# Patient Record
Sex: FEMALE | Race: WHITE | Employment: OTHER
[De-identification: names, ages, dates, MRNs, and addresses within clinical notes are randomized per-mention and may not be internally consistent; named-entity substitution may affect disease eponyms.]

---

## 2017-01-03 RX ORDER — NAPROXEN 500 MG/1
500 TABLET ORAL 2 TIMES DAILY WITH MEALS
Qty: 60 TABLET | Refills: 0 | Status: SHIPPED | OUTPATIENT
Start: 2017-01-03 | End: 2017-01-09 | Stop reason: SDUPTHER

## 2017-01-09 ENCOUNTER — TELEPHONE (OUTPATIENT)
Dept: PRIMARY CARE CLINIC | Facility: CLINIC | Age: 47
End: 2017-01-09

## 2017-01-09 ENCOUNTER — OFFICE VISIT (OUTPATIENT)
Dept: PRIMARY CARE CLINIC | Facility: CLINIC | Age: 47
End: 2017-01-09

## 2017-01-09 VITALS
WEIGHT: 204.5 LBS | BODY MASS INDEX: 36.23 KG/M2 | RESPIRATION RATE: 18 BRPM | HEART RATE: 82 BPM | HEIGHT: 63 IN | DIASTOLIC BLOOD PRESSURE: 106 MMHG | SYSTOLIC BLOOD PRESSURE: 136 MMHG

## 2017-01-09 DIAGNOSIS — M25.552 PAIN OF BOTH HIP JOINTS: ICD-10-CM

## 2017-01-09 DIAGNOSIS — G40.909 SEIZURE DISORDER (HCC): Primary | ICD-10-CM

## 2017-01-09 DIAGNOSIS — M25.551 PAIN OF BOTH HIP JOINTS: ICD-10-CM

## 2017-01-09 DIAGNOSIS — M54.50 CHRONIC BILATERAL LOW BACK PAIN WITHOUT SCIATICA: ICD-10-CM

## 2017-01-09 DIAGNOSIS — K59.01 SLOW TRANSIT CONSTIPATION: ICD-10-CM

## 2017-01-09 DIAGNOSIS — G89.29 CHRONIC BILATERAL LOW BACK PAIN WITHOUT SCIATICA: ICD-10-CM

## 2017-01-09 DIAGNOSIS — G43.709 CHRONIC MIGRAINE WITHOUT AURA WITHOUT STATUS MIGRAINOSUS, NOT INTRACTABLE: ICD-10-CM

## 2017-01-09 PROCEDURE — 99214 OFFICE O/P EST MOD 30 MIN: CPT | Performed by: FAMILY MEDICINE

## 2017-01-09 RX ORDER — BUTALBITAL/ACETAMINOPHEN 50MG-325MG
1 TABLET ORAL 4 TIMES DAILY PRN
Qty: 120 TABLET | Refills: 2 | Status: SHIPPED | OUTPATIENT
Start: 2017-01-09 | End: 2020-04-07

## 2017-01-09 RX ORDER — ACETAMINOPHEN AND CODEINE PHOSPHATE 300; 30 MG/1; MG/1
1 TABLET ORAL EVERY 6 HOURS PRN
Qty: 60 TABLET | Refills: 0 | Status: SHIPPED | OUTPATIENT
Start: 2017-01-09 | End: 2017-06-12 | Stop reason: ALTCHOICE

## 2017-01-09 RX ORDER — NAPROXEN 500 MG/1
500 TABLET ORAL 2 TIMES DAILY WITH MEALS
Qty: 60 TABLET | Refills: 0 | Status: SHIPPED | OUTPATIENT
Start: 2017-01-09 | End: 2017-01-31 | Stop reason: SDUPTHER

## 2017-01-09 RX ORDER — PROPRANOLOL HYDROCHLORIDE 80 MG/1
80 TABLET ORAL DAILY
Qty: 90 TABLET | Refills: 1 | Status: SHIPPED | OUTPATIENT
Start: 2017-01-09 | End: 2017-06-12 | Stop reason: SDUPTHER

## 2017-01-09 RX ORDER — ACETAMINOPHEN 500 MG
500 TABLET ORAL EVERY 6 HOURS PRN
COMMUNITY

## 2017-01-09 RX ORDER — CARBAMAZEPINE 200 MG
400 TABLET ORAL 2 TIMES DAILY
Qty: 120 TABLET | Refills: 5 | Status: SHIPPED | OUTPATIENT
Start: 2017-01-09 | End: 2017-06-12 | Stop reason: ALTCHOICE

## 2017-01-09 RX ORDER — SUMATRIPTAN 100 MG/1
100 TABLET, FILM COATED ORAL
Qty: 8 TABLET | Refills: 0 | Status: SHIPPED | OUTPATIENT
Start: 2017-01-09 | End: 2017-02-28 | Stop reason: SDUPTHER

## 2017-01-09 RX ORDER — DULOXETIN HYDROCHLORIDE 60 MG/1
60 CAPSULE, DELAYED RELEASE ORAL DAILY
Qty: 30 CAPSULE | Refills: 5 | Status: SHIPPED | OUTPATIENT
Start: 2017-01-09 | End: 2017-06-12 | Stop reason: SDUPTHER

## 2017-01-09 RX ORDER — OMEPRAZOLE 20 MG/1
40 CAPSULE, DELAYED RELEASE ORAL DAILY
Qty: 180 CAPSULE | Refills: 1 | Status: SHIPPED | OUTPATIENT
Start: 2017-01-09 | End: 2017-08-02 | Stop reason: SDUPTHER

## 2017-01-16 ENCOUNTER — TELEPHONE (OUTPATIENT)
Dept: PRIMARY CARE CLINIC | Facility: CLINIC | Age: 47
End: 2017-01-16

## 2017-01-22 ASSESSMENT — ENCOUNTER SYMPTOMS
PHOTOPHOBIA: 0
NAUSEA: 0
DIARRHEA: 0
TROUBLE SWALLOWING: 0
SHORTNESS OF BREATH: 0
ABDOMINAL DISTENTION: 0
COUGH: 0
WHEEZING: 0
VOMITING: 0
COLOR CHANGE: 0
ABDOMINAL PAIN: 0
BACK PAIN: 1
CONSTIPATION: 0

## 2017-01-31 RX ORDER — NAPROXEN 500 MG/1
500 TABLET ORAL 2 TIMES DAILY WITH MEALS
Qty: 60 TABLET | Refills: 0 | Status: SHIPPED | OUTPATIENT
Start: 2017-01-31 | End: 2017-03-02 | Stop reason: SDUPTHER

## 2017-01-31 RX ORDER — ACETAMINOPHEN AND CODEINE PHOSPHATE 300; 30 MG/1; MG/1
1 TABLET ORAL EVERY 6 HOURS PRN
Qty: 60 TABLET | Refills: 0 | Status: CANCELLED | OUTPATIENT
Start: 2017-01-31

## 2017-02-28 RX ORDER — SUMATRIPTAN 100 MG/1
100 TABLET, FILM COATED ORAL
Qty: 8 TABLET | Refills: 0 | Status: SHIPPED | OUTPATIENT
Start: 2017-02-28 | End: 2017-04-24 | Stop reason: SDUPTHER

## 2017-03-02 RX ORDER — MIRTAZAPINE 45 MG/1
45 TABLET, FILM COATED ORAL NIGHTLY
Qty: 30 TABLET | Refills: 3 | Status: SHIPPED | OUTPATIENT
Start: 2017-03-02 | End: 2017-05-09 | Stop reason: SDUPTHER

## 2017-03-02 RX ORDER — NAPROXEN 500 MG/1
500 TABLET ORAL 2 TIMES DAILY WITH MEALS
Qty: 60 TABLET | Refills: 0 | Status: SHIPPED | OUTPATIENT
Start: 2017-03-02 | End: 2017-04-10 | Stop reason: SDUPTHER

## 2017-04-10 RX ORDER — NAPROXEN 500 MG/1
500 TABLET ORAL 2 TIMES DAILY WITH MEALS
Qty: 60 TABLET | Refills: 0 | Status: SHIPPED | OUTPATIENT
Start: 2017-04-10 | End: 2017-06-09 | Stop reason: SDUPTHER

## 2017-04-24 RX ORDER — SUMATRIPTAN 100 MG/1
100 TABLET, FILM COATED ORAL
Qty: 8 TABLET | Refills: 2 | Status: SHIPPED | OUTPATIENT
Start: 2017-04-24 | End: 2017-10-23 | Stop reason: SDUPTHER

## 2017-05-10 RX ORDER — MIRTAZAPINE 45 MG/1
45 TABLET, FILM COATED ORAL NIGHTLY
Qty: 30 TABLET | Refills: 3 | Status: SHIPPED | OUTPATIENT
Start: 2017-05-10 | End: 2017-06-12 | Stop reason: ALTCHOICE

## 2017-05-17 ENCOUNTER — TELEPHONE (OUTPATIENT)
Dept: PRIMARY CARE CLINIC | Age: 47
End: 2017-05-17

## 2017-05-17 RX ORDER — AMITRIPTYLINE HYDROCHLORIDE 25 MG/1
25 TABLET, FILM COATED ORAL NIGHTLY
Qty: 30 TABLET | Refills: 3 | Status: SHIPPED | OUTPATIENT
Start: 2017-05-17 | End: 2017-09-04 | Stop reason: SDUPTHER

## 2017-06-09 RX ORDER — NAPROXEN 500 MG/1
500 TABLET ORAL 2 TIMES DAILY WITH MEALS
Qty: 60 TABLET | Refills: 0 | Status: SHIPPED | OUTPATIENT
Start: 2017-06-09 | End: 2017-07-06 | Stop reason: SDUPTHER

## 2017-06-12 ENCOUNTER — OFFICE VISIT (OUTPATIENT)
Dept: PRIMARY CARE CLINIC | Age: 47
End: 2017-06-12
Payer: MEDICAID

## 2017-06-12 VITALS
HEART RATE: 73 BPM | BODY MASS INDEX: 35.7 KG/M2 | RESPIRATION RATE: 16 BRPM | HEIGHT: 63 IN | DIASTOLIC BLOOD PRESSURE: 79 MMHG | SYSTOLIC BLOOD PRESSURE: 105 MMHG | WEIGHT: 201.5 LBS

## 2017-06-12 DIAGNOSIS — G40.909 SEIZURE DISORDER (HCC): Primary | ICD-10-CM

## 2017-06-12 DIAGNOSIS — M79.7 FIBROMYALGIA: ICD-10-CM

## 2017-06-12 DIAGNOSIS — G43.709 CHRONIC MIGRAINE WITHOUT AURA WITHOUT STATUS MIGRAINOSUS, NOT INTRACTABLE: ICD-10-CM

## 2017-06-12 DIAGNOSIS — G47.33 OSA (OBSTRUCTIVE SLEEP APNEA): ICD-10-CM

## 2017-06-12 PROCEDURE — 99214 OFFICE O/P EST MOD 30 MIN: CPT | Performed by: FAMILY MEDICINE

## 2017-06-12 RX ORDER — ATORVASTATIN CALCIUM 10 MG/1
TABLET, FILM COATED ORAL
Qty: 90 TABLET | Refills: 3 | Status: SHIPPED | OUTPATIENT
Start: 2017-06-12 | End: 2018-06-29 | Stop reason: SDUPTHER

## 2017-06-12 RX ORDER — ASPIRIN 81 MG/1
TABLET ORAL
Qty: 90 TABLET | Refills: 3 | Status: SHIPPED | OUTPATIENT
Start: 2017-06-12 | End: 2018-06-29 | Stop reason: SDUPTHER

## 2017-06-12 RX ORDER — TOPIRAMATE 50 MG/1
50 TABLET, FILM COATED ORAL 2 TIMES DAILY
Qty: 60 TABLET | Refills: 0 | Status: SHIPPED | OUTPATIENT
Start: 2017-06-12 | End: 2017-07-18 | Stop reason: SDUPTHER

## 2017-06-12 RX ORDER — DULOXETIN HYDROCHLORIDE 60 MG/1
60 CAPSULE, DELAYED RELEASE ORAL DAILY
Qty: 30 CAPSULE | Refills: 5 | Status: SHIPPED | OUTPATIENT
Start: 2017-06-12 | End: 2018-02-04 | Stop reason: SDUPTHER

## 2017-06-12 RX ORDER — TOPIRAMATE 50 MG/1
50 TABLET, FILM COATED ORAL 2 TIMES DAILY
COMMUNITY
End: 2017-06-12 | Stop reason: SDUPTHER

## 2017-06-12 RX ORDER — PROPRANOLOL HYDROCHLORIDE 80 MG/1
80 TABLET ORAL DAILY
Qty: 90 TABLET | Refills: 1 | Status: SHIPPED | OUTPATIENT
Start: 2017-06-12 | End: 2018-01-04 | Stop reason: SDUPTHER

## 2017-06-13 ENCOUNTER — TELEPHONE (OUTPATIENT)
Dept: PRIMARY CARE CLINIC | Age: 47
End: 2017-06-13

## 2017-06-30 ASSESSMENT — ENCOUNTER SYMPTOMS
SHORTNESS OF BREATH: 0
CONSTIPATION: 0
ABDOMINAL PAIN: 0
NAUSEA: 0
VOMITING: 0
DIARRHEA: 0
ABDOMINAL DISTENTION: 0
COUGH: 0
TROUBLE SWALLOWING: 0
PHOTOPHOBIA: 0
WHEEZING: 0
COLOR CHANGE: 0
BACK PAIN: 0

## 2017-07-06 RX ORDER — NAPROXEN 500 MG/1
TABLET ORAL
Qty: 60 TABLET | Refills: 0 | Status: SHIPPED | OUTPATIENT
Start: 2017-07-06 | End: 2017-08-09 | Stop reason: SDUPTHER

## 2017-07-18 ENCOUNTER — HOSPITAL ENCOUNTER (OUTPATIENT)
Age: 47
Discharge: HOME | End: 2017-07-18
Payer: MEDICAID

## 2017-07-18 DIAGNOSIS — F44.5 PSEUDOSEIZURE: ICD-10-CM

## 2017-07-18 DIAGNOSIS — Q04.8 MALFORMATION OF CORTICAL DEVELOPMENT OF BRAIN (HCC): ICD-10-CM

## 2017-07-18 DIAGNOSIS — G44.221 CHRONIC TENSION-TYPE HEADACHE, INTRACTABLE: ICD-10-CM

## 2017-07-18 LAB
ALBUMIN SERPL-MCNC: 4.2 G/DL (ref 3.5–5.2)
ALBUMIN/GLOBULIN RATIO: 1.4 (ref 1–2.5)
ALP BLD-CCNC: 104 U/L (ref 35–104)
ALT SERPL-CCNC: 18 U/L (ref 5–33)
ANION GAP SERPL CALCULATED.3IONS-SCNC: 13 MMOL/L (ref 9–17)
AST SERPL-CCNC: 14 U/L
BILIRUB SERPL-MCNC: 0.24 MG/DL (ref 0.3–1.2)
BILIRUBIN DIRECT: <0.08 MG/DL
BILIRUBIN, INDIRECT: ABNORMAL MG/DL (ref 0–1)
BUN BLDV-MCNC: 13 MG/DL (ref 6–20)
BUN/CREAT BLD: 18 (ref 9–20)
CALCIUM SERPL-MCNC: 9.3 MG/DL (ref 8.6–10.4)
CHLORIDE BLD-SCNC: 108 MMOL/L (ref 98–107)
CO2: 21 MMOL/L (ref 20–31)
CREAT SERPL-MCNC: 0.74 MG/DL (ref 0.5–0.9)
GFR AFRICAN AMERICAN: >60 ML/MIN
GFR NON-AFRICAN AMERICAN: >60 ML/MIN
GFR SERPL CREATININE-BSD FRML MDRD: ABNORMAL ML/MIN/{1.73_M2}
GFR SERPL CREATININE-BSD FRML MDRD: ABNORMAL ML/MIN/{1.73_M2}
GLOBULIN: ABNORMAL G/DL (ref 1.5–3.8)
GLUCOSE BLD-MCNC: 111 MG/DL (ref 70–99)
HCT VFR BLD CALC: 36.1 % (ref 36–46)
HEMOGLOBIN: 12.4 G/DL (ref 12–16)
MCH RBC QN AUTO: 29.2 PG (ref 26–34)
MCHC RBC AUTO-ENTMCNC: 34.4 G/DL (ref 31–37)
MCV RBC AUTO: 84.8 FL (ref 80–100)
PDW BLD-RTO: 13.3 % (ref 12.1–15.2)
PLATELET # BLD: 231 K/UL (ref 140–450)
PMV BLD AUTO: 8.5 FL (ref 6–12)
POTASSIUM SERPL-SCNC: 4.4 MMOL/L (ref 3.7–5.3)
RBC # BLD: 4.26 M/UL (ref 4–5.2)
SODIUM BLD-SCNC: 142 MMOL/L (ref 135–144)
TOTAL PROTEIN: 7.3 G/DL (ref 6.4–8.3)
WBC # BLD: 6.5 K/UL (ref 3.5–11)

## 2017-07-18 PROCEDURE — 80048 BASIC METABOLIC PNL TOTAL CA: CPT

## 2017-07-18 PROCEDURE — 80201 ASSAY OF TOPIRAMATE: CPT

## 2017-07-18 PROCEDURE — 36415 COLL VENOUS BLD VENIPUNCTURE: CPT

## 2017-07-18 PROCEDURE — 85027 COMPLETE CBC AUTOMATED: CPT

## 2017-07-18 PROCEDURE — 80076 HEPATIC FUNCTION PANEL: CPT

## 2017-07-20 LAB — TOPIRAMATE LEVEL: 5.8 UG/ML (ref 5–20)

## 2017-08-02 RX ORDER — OMEPRAZOLE 20 MG/1
CAPSULE, DELAYED RELEASE ORAL
Qty: 180 CAPSULE | Refills: 1 | Status: SHIPPED | OUTPATIENT
Start: 2017-08-02 | End: 2018-02-04 | Stop reason: SDUPTHER

## 2017-08-09 RX ORDER — NAPROXEN 500 MG/1
TABLET ORAL
Qty: 60 TABLET | Refills: 0 | Status: SHIPPED | OUTPATIENT
Start: 2017-08-09 | End: 2017-09-11 | Stop reason: SDUPTHER

## 2017-09-05 RX ORDER — AMITRIPTYLINE HYDROCHLORIDE 25 MG/1
TABLET, FILM COATED ORAL
Qty: 30 TABLET | Refills: 3 | Status: SHIPPED | OUTPATIENT
Start: 2017-09-05 | End: 2018-01-11 | Stop reason: SDUPTHER

## 2017-09-11 RX ORDER — NAPROXEN 500 MG/1
TABLET ORAL
Qty: 60 TABLET | Refills: 0 | Status: SHIPPED | OUTPATIENT
Start: 2017-09-11 | End: 2020-04-07

## 2017-10-03 ENCOUNTER — OFFICE VISIT (OUTPATIENT)
Dept: PRIMARY CARE CLINIC | Age: 47
End: 2017-10-03
Payer: MEDICAID

## 2017-10-03 VITALS
SYSTOLIC BLOOD PRESSURE: 105 MMHG | WEIGHT: 189.7 LBS | HEART RATE: 72 BPM | BODY MASS INDEX: 34.7 KG/M2 | DIASTOLIC BLOOD PRESSURE: 80 MMHG | TEMPERATURE: 98.2 F | RESPIRATION RATE: 20 BRPM

## 2017-10-03 DIAGNOSIS — M25.552 BILATERAL HIP PAIN: Primary | ICD-10-CM

## 2017-10-03 DIAGNOSIS — I10 ESSENTIAL HYPERTENSION: ICD-10-CM

## 2017-10-03 DIAGNOSIS — Z72.0 TOBACCO ABUSE: ICD-10-CM

## 2017-10-03 DIAGNOSIS — R73.09 ELEVATED GLUCOSE: ICD-10-CM

## 2017-10-03 DIAGNOSIS — Z13.220 LIPID SCREENING: ICD-10-CM

## 2017-10-03 DIAGNOSIS — M25.551 BILATERAL HIP PAIN: Primary | ICD-10-CM

## 2017-10-03 DIAGNOSIS — Z12.39 BREAST CANCER SCREENING: ICD-10-CM

## 2017-10-03 DIAGNOSIS — R94.31 ABNORMAL ECG: ICD-10-CM

## 2017-10-03 DIAGNOSIS — E66.9 OBESITY (BMI 30.0-34.9): ICD-10-CM

## 2017-10-03 PROCEDURE — 99213 OFFICE O/P EST LOW 20 MIN: CPT | Performed by: NURSE PRACTITIONER

## 2017-10-03 ASSESSMENT — ENCOUNTER SYMPTOMS
TROUBLE SWALLOWING: 0
GASTROINTESTINAL NEGATIVE: 1
EYES NEGATIVE: 1
RESPIRATORY NEGATIVE: 1

## 2017-10-03 ASSESSMENT — PATIENT HEALTH QUESTIONNAIRE - PHQ9
SUM OF ALL RESPONSES TO PHQ9 QUESTIONS 1 & 2: 1
2. FEELING DOWN, DEPRESSED OR HOPELESS: 1
SUM OF ALL RESPONSES TO PHQ QUESTIONS 1-9: 1
1. LITTLE INTEREST OR PLEASURE IN DOING THINGS: 0

## 2017-10-03 NOTE — PROGRESS NOTES
Subjective:      Patient ID: Alex Brennan is a 41 y.o. female. Have you seen any other physician or provider since your last visit yes - Dr Heather Toscano you had any other diagnostic tests since your last visit? labs    Have you changed or stopped any medications since your last visit including any over-the-counter medicines, vitamins, or herbal medicines? no     Are you taking all your prescribed medications? Yes  If NO, why? -  N/A           Patient Self-Management Goal for this visit. What is your goal for your visit today? - Maintain/Improve health   Barriers to success: none   Plan for overcoming my barriers: N/A      Confidence: 8/10   Date goal set: 10/3/17   Date expected to reach goal: 1month      Health Maintenance Due   Topic Date Due    HIV screen  09/21/1987    DTaP/Tdap/Td vaccine (1 - Tdap) 09/21/1991    Cervical cancer screen  09/21/1993    Lipid screen  09/21/2012    Diabetes screen  09/21/2012    Flu vaccine (1) 09/01/2017     HPI Comments: Previously referred to pain management, has seen  for chronic bilateral hip pain. Patient states she did not follow up with Dr. Wagner Velasquez. States she did not schedule with pain management. Sees Dr Vanessa Anthony for headaches and seizures. States he prescribed all her medications. States she does not need refills today. Hip Pain    Incident onset: approx 1 year. Incident location: no injury. There was no injury mechanism. Pain location: sides and backs of bilateral hips. The quality of the pain is described as aching. The pain is at a severity of 7/10. The pain is moderate. The pain has been constant since onset. She reports no foreign bodies present. Exacerbated by: lying on sides to sleep, standing too long. Treatments tried: [employer], states has done Physical Therapy, heat , Ice and Tylenol in the past.       Review of Systems   Constitutional: Negative. Negative for activity change, appetite change and fever.    HENT: Negative for trouble swallowing. Eyes: Negative. Negative for visual disturbance. Respiratory: Negative. Cardiovascular: Negative. Gastrointestinal: Negative. Endocrine: Negative. Negative for polydipsia, polyphagia and polyuria. Genitourinary: Negative. Musculoskeletal: Positive for arthralgias. Bilateral chronic hip pain    Skin: Negative. Negative for rash. Neurological: Positive for seizures and headaches (chonic ). Seizures, sees Dr. Gamboa Corporal     Psychiatric/Behavioral: Negative. Negative for self-injury, sleep disturbance and suicidal ideas. Objective:   Physical Exam   Constitutional: She is oriented to person, place, and time. Vital signs are normal. She appears well-developed and well-nourished. She is cooperative. Non-toxic appearance. She does not appear ill. No distress. Appears well hydrated and non toxic. Sitting upright on exam table without distress. Respirations are regular, non labored and quiet. HENT:   Head: Normocephalic and atraumatic. Nose: Nose normal.   Mouth/Throat: Uvula is midline, oropharynx is clear and moist and mucous membranes are normal. She does not have dentures. Abnormal dentition. Poor dentition   Eyes: Conjunctivae and EOM are normal. Pupils are equal, round, and reactive to light. Neck: Normal range of motion. Neck supple. No tracheal deviation present. No thyromegaly present. Cardiovascular: Normal rate, regular rhythm, normal heart sounds and intact distal pulses. Exam reveals no gallop and no friction rub. No murmur heard. Pulses:       Radial pulses are 2+ on the left side. Dorsalis pedis pulses are 2+ on the right side, and 2+ on the left side. No peripheral edema   Pulmonary/Chest: Effort normal and breath sounds normal. No accessory muscle usage. No tachypnea. No respiratory distress. She has no wheezes. She has no rhonchi. She has no rales. Abdominal: Soft.  Normal appearance and bowel sounds are normal. She exhibits no distension, no abdominal bruit, no pulsatile midline mass and no mass. There is no hepatosplenomegaly. There is no tenderness. There is no rigidity, no rebound, no guarding, no tenderness at McBurney's point and negative Vasquez's sign. Musculoskeletal: She exhibits no edema or tenderness. Right hip: She exhibits decreased range of motion and bony tenderness. Left hip: She exhibits decreased range of motion and bony tenderness. Bilateral hip pain, point tenderness to greater trochanter. No erythema, no swelling, no red streaks, no warmth. Strength strong and equal bilaterally. Lymphadenopathy:     She has no cervical adenopathy. Neurological: She is alert and oriented to person, place, and time. She has normal strength. No cranial nerve deficit. She exhibits normal muscle tone. Coordination normal.   Reflex Scores:       Patellar reflexes are 2+ on the right side and 2+ on the left side. Skin: Skin is warm and dry. No abrasion, no bruising, no ecchymosis and no rash noted. She is not diaphoretic. No erythema. No pallor. Psychiatric: She has a normal mood and affect. Her speech is normal and behavior is normal. Judgment and thought content normal.   Nursing note and vitals reviewed. Assessment:      1. Bilateral hip pain  XR HIP 3-4 VW W PELVIS BILATERAL   2. Essential hypertension  CBC Auto Differential    Comprehensive Metabolic Panel    Microalbumin, Ur    Urinalysis    TSH With Reflex Ft4    CBC Auto Differential    Comprehensive Metabolic Panel    Urinalysis    Microalbumin, Ur   3. Tobacco abuse     4. Elevated glucose  Hemoglobin A1C   5. Lipid screening  Lipid Panel   6. Breast cancer screening  TAMRA DIGITAL SCREEN W OR WO CAD BILATERAL   7. Abnormal ECG  EKG 12 Lead           Plan:      Discussed at length with Crystal lifestyle modifications with diet and exercise including weight loss. Informed to contact OBGYN for routine PAP.      Contact pain management as

## 2017-10-03 NOTE — PATIENT INSTRUCTIONS
High Blood Pressure: Care Instructions  Your Care Instructions  If your blood pressure is usually above 140/90, you have high blood pressure, or hypertension. That means the top number is 140 or higher or the bottom number is 90 or higher, or both. Despite what a lot of people think, high blood pressure usually doesn't cause headaches or make you feel dizzy or lightheaded. It usually has no symptoms. But it does increase your risk for heart attack, stroke, and kidney or eye damage. The higher your blood pressure, the more your risk increases. Your doctor will give you a goal for your blood pressure. Your goal will be based on your health and your age. An example of a goal is to keep your blood pressure below 140/90. Lifestyle changes, such as eating healthy and being active, are always important to help lower blood pressure. You might also take medicine to reach your blood pressure goal.  Follow-up care is a key part of your treatment and safety. Be sure to make and go to all appointments, and call your doctor if you are having problems. It's also a good idea to know your test results and keep a list of the medicines you take. How can you care for yourself at home? Medical treatment  · If you stop taking your medicine, your blood pressure will go back up. You may take one or more types of medicine to lower your blood pressure. Be safe with medicines. Take your medicine exactly as prescribed. Call your doctor if you think you are having a problem with your medicine. · Talk to your doctor before you start taking aspirin every day. Aspirin can help certain people lower their risk of a heart attack or stroke. But taking aspirin isn't right for everyone, because it can cause serious bleeding. · See your doctor regularly. You may need to see the doctor more often at first or until your blood pressure comes down.   · If you are taking blood pressure medicine, talk to your doctor before you take decongestants or anti-inflammatory medicine, such as ibuprofen. Some of these medicines can raise blood pressure. · Learn how to check your blood pressure at home. Lifestyle changes  · Stay at a healthy weight. This is especially important if you put on weight around the waist. Losing even 10 pounds can help you lower your blood pressure. · If your doctor recommends it, get more exercise. Walking is a good choice. Bit by bit, increase the amount you walk every day. Try for at least 30 minutes on most days of the week. You also may want to swim, bike, or do other activities. · Avoid or limit alcohol. Talk to your doctor about whether you can drink any alcohol. · Try to limit how much sodium you eat to less than 2,300 milligrams (mg) a day. Your doctor may ask you to try to eat less than 1,500 mg a day. · Eat plenty of fruits (such as bananas and oranges), vegetables, legumes, whole grains, and low-fat dairy products. · Lower the amount of saturated fat in your diet. Saturated fat is found in animal products such as milk, cheese, and meat. Limiting these foods may help you lose weight and also lower your risk for heart disease. · Do not smoke. Smoking increases your risk for heart attack and stroke. If you need help quitting, talk to your doctor about stop-smoking programs and medicines. These can increase your chances of quitting for good. When should you call for help? Call 911 anytime you think you may need emergency care. This may mean having symptoms that suggest that your blood pressure is causing a serious heart or blood vessel problem. Your blood pressure may be over 180/110. For example, call 911 if:  · You have symptoms of a heart attack. These may include:  ¨ Chest pain or pressure, or a strange feeling in the chest.  ¨ Sweating. ¨ Shortness of breath. ¨ Nausea or vomiting. ¨ Pain, pressure, or a strange feeling in the back, neck, jaw, or upper belly or in one or both shoulders or arms.   ¨ Lightheadedness or sudden weakness. ¨ A fast or irregular heartbeat. · You have symptoms of a stroke. These may include:  ¨ Sudden numbness, tingling, weakness, or loss of movement in your face, arm, or leg, especially on only one side of your body. ¨ Sudden vision changes. ¨ Sudden trouble speaking. ¨ Sudden confusion or trouble understanding simple statements. ¨ Sudden problems with walking or balance. ¨ A sudden, severe headache that is different from past headaches. · You have severe back or belly pain. Do not wait until your blood pressure comes down on its own. Get help right away. Call your doctor now or seek immediate care if:  · Your blood pressure is much higher than normal (such as 180/110 or higher), but you don't have symptoms. · You think high blood pressure is causing symptoms, such as:  ¨ Severe headache. ¨ Blurry vision. Watch closely for changes in your health, and be sure to contact your doctor if:  · Your blood pressure measures 140/90 or higher at least 2 times. That means the top number is 140 or higher or the bottom number is 90 or higher, or both. · You think you may be having side effects from your blood pressure medicine. · Your blood pressure is usually normal, but it goes above normal at least 2 times. Where can you learn more? Go to https://[employer] Solutions.Panraven. org and sign in to your MiMedia account. Enter M095 in the Music Messenger (MM) box to learn more about \"High Blood Pressure: Care Instructions. \"     If you do not have an account, please click on the \"Sign Up Now\" link. Current as of: August 8, 2016  Content Version: 11.3  © 8086-5161 Healthwise, Incorporated. Care instructions adapted under license by [facility]"[employer] [facility] If you have questions about a medical condition or this instruction, always ask your healthcare professional. [place] [address] any warranty or liability for your use of this information.        Hip Pain: Care Instructions  Your Care weight. · Your buttocks, legs, or feet feel numb or tingly. · Your leg or foot is cool or pale or changes color. · You have severe pain. Call your doctor now or seek immediate medical care if:  · You have signs of infection, such as:  ¨ Increased pain, swelling, warmth, or redness in the hip area. ¨ Red streaks leading from the hip area. ¨ Pus draining from the hip area. ¨ A fever. · You have signs of a blood clot, such as:  ¨ Pain in your calf, back of the knee, thigh, or groin. ¨ Redness and swelling in your leg or groin. · You are not able to bend, straighten, or move your leg normally. · You have trouble urinating or having bowel movements. Watch closely for changes in your health, and be sure to contact your doctor if:  · You do not get better as expected. Where can you learn more? Go to https://TapPress.Worldrat. org and sign in to your [employer] account. Enter S218 in the Wear Inns box to learn more about \"Hip Pain: Care Instructions. \"     If you do not have an account, please click on the \"Sign Up Now\" link. Current as of: March 20, 2017  Content Version: 11.3  © 7966-7239 [employer]. Care instructions adapted under license by [employer]. If you have questions about a medical condition or this instruction, always ask your healthcare professional. [place] [address] any warranty or liability for your use of this information.

## 2017-10-03 NOTE — MR AVS SNAPSHOT
After Visit Summary             Kay Manning   10/3/2017 11:30 AM   Office Visit    Description:  Female : 1970   Provider:  Ata Kline NP   Department:  [address] [place] [address] and Future Appointments         Below is a list of your follow-up and future appointments. This may not be a complete list as you may have made appointments directly with providers that we are not aware of or your providers may have made some for you. Please call your providers to confirm appointments. It is important to keep your appointments. Please bring your current insurance card, photo ID, co-pay, and all medication bottles to your appointment. If self-pay, payment is expected at the time of service. Your To-Do List     Future Appointments Provider Department Dept Phone    1/3/2018 2:00 PM [phone]    Please arrive 15 minutes prior to appointment, bring photo ID and insurance card.     Future Orders Complete By Expires    CBC Auto Differential [SZT2296 Custom]  10/3/2017 (Approximate) 10/3/2018    Comprehensive Metabolic Panel [BJC20 Custom]  10/3/2017 (Approximate) 10/3/2018    EKG 12 Lead [EKG1 Custom]  10/3/2017 10/3/2018    Hemoglobin A1C [LAB90 Custom]  10/3/2017 (Approximate) 10/3/2018    Lipid Panel [LAB18 Custom]  10/3/2017 (Approximate) 10/3/2018    TAMRA DIGITAL SCREEN W OR WO CAD BILATERAL [ Custom]  10/3/2017 12/3/2018    Microalbumin, Ur [[id number] Custom]  10/3/2017 (Approximate) 10/3/2018    TSH With Reflex Ft4 [[id number] Custom]  10/3/2017 10/3/2018    Urinalysis [MUD059 Custom]  10/3/2017 (Approximate) 10/3/2018    XR HIP 3-4 VW W PELVIS BILATERAL [82699 Custom]  10/3/2017 10/3/2018    CBC Auto Differential [[id number] Custom]  2018 (Approximate) 10/3/2018    Comprehensive Metabolic Panel [LSS25 Custom]  2018 (Approximate) 10/3/2018    Microalbumin, Ur [SYY4099 Custom]  2018 (Approximate) 10/3/2018 kidney or eye damage. The higher your blood pressure, the more your risk increases. Your doctor will give you a goal for your blood pressure. Your goal will be based on your health and your age. An example of a goal is to keep your blood pressure below 140/90. Lifestyle changes, such as eating healthy and being active, are always important to help lower blood pressure. You might also take medicine to reach your blood pressure goal.  Follow-up care is a key part of your treatment and safety. Be sure to make and go to all appointments, and call your doctor if you are having problems. It's also a good idea to know your test results and keep a list of the medicines you take. How can you care for yourself at home? Medical treatment  · If you stop taking your medicine, your blood pressure will go back up. You may take one or more types of medicine to lower your blood pressure. Be safe with medicines. Take your medicine exactly as prescribed. Call your doctor if you think you are having a problem with your medicine. · Talk to your doctor before you start taking aspirin every day. Aspirin can help certain people lower their risk of a heart attack or stroke. But taking aspirin isn't right for everyone, because it can cause serious bleeding. · See your doctor regularly. You may need to see the doctor more often at first or until your blood pressure comes down. · If you are taking blood pressure medicine, talk to your doctor before you take decongestants or anti-inflammatory medicine, such as ibuprofen. Some of these medicines can raise blood pressure. · Learn how to check your blood pressure at home. Lifestyle changes  · Stay at a healthy weight. This is especially important if you put on weight around the waist. Losing even 10 pounds can help you lower your blood pressure. · If your doctor recommends it, get more exercise. Walking is a good choice. Bit by bit, increase the amount you walk every day.  Try for at · You have severe back or belly pain. Do not wait until your blood pressure comes down on its own. Get help right away. Call your doctor now or seek immediate care if:  · Your blood pressure is much higher than normal (such as 180/110 or higher), but you don't have symptoms. · You think high blood pressure is causing symptoms, such as:  ¨ Severe headache. ¨ Blurry vision. Watch closely for changes in your health, and be sure to contact your doctor if:  · Your blood pressure measures 140/90 or higher at least 2 times. That means the top number is 140 or higher or the bottom number is 90 or higher, or both. · You think you may be having side effects from your blood pressure medicine. · Your blood pressure is usually normal, but it goes above normal at least 2 times. Where can you learn more? Go to [email or web address] and sign in to your Campalyst account. Enter M222 in the HuTerra box to learn more about \"High Blood Pressure: Care Instructions. \"     If you do not have an account, please click on the \"Sign Up Now\" link. Current as of: August 8, 2016  Content Version: 11.3  © 8503-2579 [employer]Reca!. Care instructions adapted under license by [facility] ([facility]). If you have questions about a medical condition or this instruction, always ask your healthcare professional. [place] [address] any warranty or liability for your use of this information. Hip Pain: Care Instructions  Your Care Instructions  Hip pain may be caused by many things, including overuse, a fall, or a twisting movement. Another cause of hip pain is arthritis. Your pain may increase when you stand up, walk, or squat. The pain may come and go or may be constant. Home treatment can help relieve hip pain, swelling, and stiffness. If your pain is ongoing, you may need more tests and treatment. Follow-up care is a key part of your treatment and safety.  Be sure to make daily as needed (pain)    Sennosides (GNP LAXATIVE PILLS) 25 MG TABS TAKE ONE TABLET BY MOUTH TWICE A DAY    albuterol sulfate  (90 BASE) MCG/ACT inhaler Inhale 2 puffs into the lungs every 4 hours as needed for Wheezing    metoprolol tartrate (LOPRESSOR) 25 MG tablet take 1 tablet by mouth twice a day    acetaminophen (TYLENOL) 500 MG tablet Take 500 mg by mouth every 6 hours as needed for Pain      Allergies              Ciprofloxacin     Penicillins     Ranitidine Hcl       We Ordered/Performed the following           [facility]- Counselor     Scheduling Instructions:    Ursula Cartwright, [address] [facility]  [phone]    Comments: The patient can be scheduled with any member of the group, including the provider with the first available appointments. Additional Information        Basic Information     Date Of Birth Sex Race Ethnicity Preferred Language    1970 Female White Non-/Non  English      Problem List as of 10/3/2017  Date Reviewed: 10/3/2017                Pseudoseizure    Chronic tension-type headache, intractable    Malformation of cortical development of brain ([facility] [address]    Abnormal cardiovascular stress test    Acute appendicitis    Abnormal ECG      Immunizations as of 10/3/2017     Name Date    Influenza Vaccine, unspecified formulation 9/27/2016    Influenza Virus Vaccine 10/2/2017      Preventive Care        Date Due    HIV screening is recommended for all people regardless of risk factors  aged 15-65 years at least once (lifetime) who have never been HIV tested. 9/21/1987    Tetanus Combination Vaccine (1 - Tdap) 9/21/1991    Cholesterol Screening 9/21/2012    Diabetes Screening 9/21/2012    Pap Smear 10/3/2018 (Originally 9/21/1993)            [address] [place] [address] records indicate that you have an active [employer] account.     You can view your After Visit Summary by going to [email or web address] and logging in with your [email or web address] username and password. If you don't have a [email or web address] username and password but a parent or guardian has access to your record, the parent or guardian should login with their own [email or web address] username and password and access your record to view the After Visit Summary. Additional Information  If you have questions, please contact the physician practice where you receive care. Remember, [email or web address] is NOT to be used for urgent needs. For medical emergencies, dial 911. For questions regarding your [email or web address] account call [phone]. If you have a clinical question, please call your doctor's office.

## 2017-10-17 RX ORDER — NAPROXEN 500 MG/1
TABLET ORAL
Qty: 60 TABLET | Refills: 0 | OUTPATIENT
Start: 2017-10-17

## 2017-10-17 NOTE — TELEPHONE ENCOUNTER
Called patient and informed her that Roberto Culp did not refill her Naproxen because the medication is not intended for long term use due to potential cardiac and GI risks. Patient would like to know what she can use for her bilateral hip pain until she is seen by pain management. Please advise.

## 2017-10-19 NOTE — TELEPHONE ENCOUNTER
Called patient and left a message inquiring that patient has been referred to pain management per Dr Marian Kinney and wanting to know if they are handleing her pain medications, also informed her that an xray was ordered and Estrella Agustin would like her to have that done. And that she can take Tylenol, Motrin or Aleve OTC. Message left for pt to call office.

## 2017-10-23 RX ORDER — SUMATRIPTAN 100 MG/1
TABLET, FILM COATED ORAL
Qty: 8 TABLET | Refills: 0 | Status: SHIPPED | OUTPATIENT
Start: 2017-10-23

## 2017-10-23 NOTE — TELEPHONE ENCOUNTER
Health Maintenance   Topic Date Due    HIV screen  09/21/1987    DTaP/Tdap/Td vaccine (1 - Tdap) 09/21/1991    Lipid screen  09/21/2012    Diabetes screen  09/21/2012    Cervical cancer screen  10/03/2018 (Originally 9/21/1993)    Flu vaccine  Completed             (applicable per patient's age: Cancer Screenings, Depression Screening, Fall Risk Screening, Immunizations)    AST (U/L)   Date Value   07/18/2017 14     ALT (U/L)   Date Value   07/18/2017 18     BUN (mg/dL)   Date Value   07/18/2017 13      (goal A1C is < 7)   (goal LDL is <100) need 30-50% reduction from baseline     BP Readings from Last 3 Encounters:   10/03/17 105/80   08/21/17 (!) 139/91   07/18/17 123/89    (goal /80)      All Future Testing planned in [facility]:  Lab Frequency Next Occurrence   Valproic Acid Level, Free Once 11/01/2016   Topiramate Level Once 10/05/2017   XR HIP 3-4 VW W PELVIS BILATERAL Once 11/21/2017   CBC Auto Differential Once 12/26/2017   Comprehensive Metabolic Panel Once 31/27/7070   Microalbumin, Ur Once 12/26/2017   Urinalysis Once 12/26/2017   Lipid Panel Once 12/26/2017   Hemoglobin A1C Once 12/26/2017   TSH With Reflex Ft4 Once 12/26/2017   CBC Auto Differential Once 01/01/2018   Comprehensive Metabolic Panel Once 67/59/2784   Urinalysis Once 01/01/2018   Microalbumin, Ur Once 01/01/2018   TAMRA DIGITAL SCREEN W OR WO CAD BILATERAL Once 11/21/2017   EKG 12 Lead Once 11/14/2017       Next Visit Date:  Future Appointments  Date Time Provider Kody Patricia   11/14/2017 3:00 PM [facility]   1/3/2018 2:00 PM Sean Rudd NP [facility]            Patient Active Problem List:     Acute appendicitis     Abnormal ECG     Acute appendicitis with generalized peritonitis     Hypertension     Tobacco abuse     Abnormal cardiovascular stress test     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

## 2017-11-08 ENCOUNTER — TELEPHONE (OUTPATIENT)
Dept: PRIMARY CARE CLINIC | Age: 47
End: 2017-11-08

## 2017-11-08 NOTE — TELEPHONE ENCOUNTER
Called [facility] to check on referral to pain management. Patient was seen on 10/30/17 @ 2:15pm by Dr Ouida Kocher.

## 2018-01-11 ENCOUNTER — OFFICE VISIT (OUTPATIENT)
Dept: PRIMARY CARE CLINIC | Age: 48
End: 2018-01-11
Payer: MEDICAID

## 2018-01-11 VITALS
SYSTOLIC BLOOD PRESSURE: 102 MMHG | HEART RATE: 63 BPM | WEIGHT: 181.8 LBS | BODY MASS INDEX: 33.25 KG/M2 | RESPIRATION RATE: 20 BRPM | DIASTOLIC BLOOD PRESSURE: 70 MMHG | TEMPERATURE: 98.1 F

## 2018-01-11 DIAGNOSIS — I10 ESSENTIAL HYPERTENSION: Primary | ICD-10-CM

## 2018-01-11 DIAGNOSIS — J06.9 UPPER RESPIRATORY TRACT INFECTION, UNSPECIFIED TYPE: ICD-10-CM

## 2018-01-11 PROCEDURE — G8482 FLU IMMUNIZE ORDER/ADMIN: HCPCS | Performed by: NURSE PRACTITIONER

## 2018-01-11 PROCEDURE — G8417 CALC BMI ABV UP PARAM F/U: HCPCS | Performed by: NURSE PRACTITIONER

## 2018-01-11 PROCEDURE — G8427 DOCREV CUR MEDS BY ELIG CLIN: HCPCS | Performed by: NURSE PRACTITIONER

## 2018-01-11 PROCEDURE — 1036F TOBACCO NON-USER: CPT | Performed by: NURSE PRACTITIONER

## 2018-01-11 PROCEDURE — 99213 OFFICE O/P EST LOW 20 MIN: CPT | Performed by: NURSE PRACTITIONER

## 2018-01-11 RX ORDER — OMEPRAZOLE 20 MG/1
CAPSULE, DELAYED RELEASE ORAL
Qty: 180 CAPSULE | Refills: 1 | Status: CANCELLED | OUTPATIENT
Start: 2018-01-11

## 2018-01-11 RX ORDER — ATORVASTATIN CALCIUM 10 MG/1
TABLET, FILM COATED ORAL
Qty: 90 TABLET | Refills: 3 | Status: CANCELLED | OUTPATIENT
Start: 2018-01-11

## 2018-01-11 RX ORDER — ASPIRIN 81 MG/1
TABLET ORAL
Qty: 90 TABLET | Refills: 3 | Status: CANCELLED | OUTPATIENT
Start: 2018-01-11

## 2018-01-11 RX ORDER — AMITRIPTYLINE HYDROCHLORIDE 25 MG/1
TABLET, FILM COATED ORAL
Qty: 30 TABLET | Refills: 3 | Status: CANCELLED | OUTPATIENT
Start: 2018-01-11

## 2018-01-11 RX ORDER — DULOXETIN HYDROCHLORIDE 60 MG/1
60 CAPSULE, DELAYED RELEASE ORAL DAILY
Qty: 30 CAPSULE | Refills: 5 | Status: CANCELLED | OUTPATIENT
Start: 2018-01-11

## 2018-01-11 RX ORDER — AMITRIPTYLINE HYDROCHLORIDE 25 MG/1
TABLET, FILM COATED ORAL
Qty: 30 TABLET | Refills: 3 | Status: SHIPPED | OUTPATIENT
Start: 2018-01-11 | End: 2018-05-11 | Stop reason: SDUPTHER

## 2018-01-11 ASSESSMENT — ENCOUNTER SYMPTOMS
GASTROINTESTINAL NEGATIVE: 1
SORE THROAT: 0
TROUBLE SWALLOWING: 0
RESPIRATORY NEGATIVE: 1
EYES NEGATIVE: 1
BLURRED VISION: 0

## 2018-01-11 NOTE — PROGRESS NOTES
Yes Senthil Robins, DO   omeprazole (PRILOSEC) 20 MG delayed release capsule take 2 capsules by mouth once daily 8/2/17  Yes Senthil Robins DO   atorvastatin (LIPITOR) 10 MG tablet TAKE ONE TABLET BY MOUTH DAILY 6/12/17  Yes Senthil Robins DO   aspirin (ASPIRIN LOW DOSE) 81 MG EC tablet TAKE ONE TABLET BY MOUTH DAILY 6/12/17  Yes Senthil Robins DO   DULoxetine (CYMBALTA) 60 MG extended release capsule Take 1 capsule by mouth daily 6/12/17  Yes Senthil Robins DO   acetaminophen (TYLENOL) 500 MG tablet Take 500 mg by mouth every 6 hours as needed for Pain   Yes Historical Provider, MD   Sennosides (GNP LAXATIVE PILLS) 25 MG TABS TAKE ONE TABLET BY MOUTH TWICE A DAY 1/9/17  Yes Senthil Robins DO   albuterol sulfate  (90 BASE) MCG/ACT inhaler Inhale 2 puffs into the lungs every 4 hours as needed for Wheezing   Yes Historical Provider, MD   SUMAtriptan (IMITREX) 100 MG tablet take 1 tablet by mouth AT ONSET headache MAY REPEAT IN 2 HOURS. NO MORE THAN 2 PER DAY 4 DAYS OUT OF A WEEK. 10/23/17   Karin Mejia, FRANCESCA   naproxen (NAPROSYN) 500 MG tablet take 1 tablet by mouth twice a day with food 9/11/17   Senthil Robins DO   metoprolol tartrate (LOPRESSOR) 25 MG tablet take 1 tablet by mouth twice a day 3/27/17   Belinda Kellogg MD   ACETAMINOPHEN-BUTALBITAL  MG TABS Take 1 tablet by mouth 4 times daily as needed (pain) 1/9/17   Senthil Robins DO        Allergies:       Ciprofloxacin; Penicillins; and Ranitidine hcl    Social History:     Tobacco:    reports that she quit smoking about 7 years ago. She has never used smokeless tobacco.  Alcohol:      reports that she does not drink alcohol. Drug Use:  reports that she does not use drugs.     Family History:     Family History   Problem Relation Age of Onset    Heart Disease Maternal Grandmother     Cancer Maternal Grandmother     Heart Disease Maternal Grandfather     Cancer Maternal Grandfather        Review of Systems:     Positive and Negative as described in HPI    Review of Systems   Constitutional: Negative. Negative for activity change, appetite change and fever. HENT: Positive for congestion. Negative for sore throat and trouble swallowing. Eyes: Negative. Negative for blurred vision and visual disturbance. Respiratory: Negative. Cardiovascular: Negative. Negative for chest pain. Gastrointestinal: Negative. Genitourinary: Negative. Skin: Negative. Neurological: Positive for seizures (history of ). Negative for headaches. States headaches are under controll, sees Dr. Sophie Alcantar    Psychiatric/Behavioral: Negative. Negative for self-injury, sleep disturbance and suicidal ideas. Physical Exam:   Vitals: There were no vitals taken for this visit. Physical Exam   Constitutional: She is oriented to person, place, and time. Vital signs are normal. She appears well-developed and well-nourished. She is cooperative. Non-toxic appearance. She does not appear ill. No distress. Appears well hydrated and non toxic. Sitting upright on exam table without distress. Respirations are regular, non labored and quiet. HENT:   Head: Normocephalic and atraumatic. Right Ear: Tympanic membrane, external ear and ear canal normal.   Left Ear: Tympanic membrane, external ear and ear canal normal.   Nose: Nose normal.   Mouth/Throat: Uvula is midline, oropharynx is clear and moist and mucous membranes are normal. She does not have dentures. No trismus in the jaw. Abnormal dentition. No uvula swelling. No oropharyngeal exudate. Eyes: Conjunctivae and EOM are normal. Pupils are equal, round, and reactive to light. Neck: Normal range of motion. Neck supple. No tracheal deviation present. No thyromegaly present. Cardiovascular: Normal rate, regular rhythm, normal heart sounds and intact distal pulses. Exam reveals no gallop and no friction rub. No murmur heard. Pulses:       Radial pulses are 2+ on the right side. Dorsalis pedis pulses are 2+ on the right side, and 2+ on the left side. No Peripheral edema   Pulmonary/Chest: Effort normal and breath sounds normal. No accessory muscle usage. No tachypnea. No respiratory distress. She has no decreased breath sounds. She has no wheezes. She has no rhonchi. She has no rales. Breath sounds are clear to auscultation over posterior bilateral fields. Chest expansion is symmetrical with non-restricted air movement. Musculoskeletal: Normal range of motion. She exhibits no edema or tenderness. Lymphadenopathy:     She has no cervical adenopathy. Neurological: She is alert and oriented to person, place, and time. No cranial nerve deficit. She exhibits normal muscle tone. Coordination and gait normal.   Reflex Scores:       Patellar reflexes are 2+ on the right side and 2+ on the left side. Skin: Skin is warm and dry. No rash noted. She is not diaphoretic. No erythema. Psychiatric: She has a normal mood and affect. Her speech is normal and behavior is normal. Judgment and thought content normal.   Nursing note and vitals reviewed. Data:     Lab Results   Component Value Date     07/18/2017    K 4.4 07/18/2017     07/18/2017    CO2 21 07/18/2017    BUN 13 07/18/2017    CREATININE 0.74 07/18/2017    GLUCOSE 111 07/18/2017    PROT 7.3 07/18/2017    LABALBU 4.2 07/18/2017    BILITOT 0.24 07/18/2017    ALKPHOS 104 07/18/2017    AST 14 07/18/2017    ALT 18 07/18/2017     Lab Results   Component Value Date    WBC 6.5 07/18/2017    RBC 4.26 07/18/2017    HGB 12.4 07/18/2017    HCT 36.1 07/18/2017    MCV 84.8 07/18/2017    MCH 29.2 07/18/2017    MCHC 34.4 07/18/2017    RDW 13.3 07/18/2017     07/18/2017    MPV 8.5 07/18/2017     No results found for: TSH  No results found for: CHOL, HDL, PSA, LABA1C    Assessment/Plan:       Kay is encouraged to continue follow-up with neurology for all headaches medications and seizure medications.     Encouraged

## 2018-04-03 RX ORDER — PROPRANOLOL HYDROCHLORIDE 80 MG/1
TABLET ORAL
Qty: 90 TABLET | Refills: 0 | Status: SHIPPED | OUTPATIENT
Start: 2018-04-03 | End: 2018-06-29 | Stop reason: SDUPTHER

## 2018-05-02 RX ORDER — OMEPRAZOLE 20 MG/1
CAPSULE, DELAYED RELEASE ORAL
Qty: 180 CAPSULE | Refills: 0 | Status: SHIPPED | OUTPATIENT
Start: 2018-05-02 | End: 2018-07-31 | Stop reason: SDUPTHER

## 2018-05-11 RX ORDER — AMITRIPTYLINE HYDROCHLORIDE 25 MG/1
TABLET, FILM COATED ORAL
Qty: 30 TABLET | Refills: 3 | Status: SHIPPED | OUTPATIENT
Start: 2018-05-11 | End: 2018-09-02 | Stop reason: SDUPTHER

## 2018-05-31 RX ORDER — DULOXETIN HYDROCHLORIDE 60 MG/1
CAPSULE, DELAYED RELEASE ORAL
Qty: 30 CAPSULE | Refills: 3 | Status: SHIPPED | OUTPATIENT
Start: 2018-05-31 | End: 2018-09-30 | Stop reason: SDUPTHER

## 2018-06-14 ENCOUNTER — TELEPHONE (OUTPATIENT)
Dept: PRIMARY CARE CLINIC | Age: 48
End: 2018-06-14

## 2018-06-29 RX ORDER — ASPIRIN 81 MG/1
TABLET ORAL
Qty: 90 TABLET | Refills: 0 | Status: SHIPPED | OUTPATIENT
Start: 2018-06-29 | End: 2018-09-23 | Stop reason: SDUPTHER

## 2018-06-29 RX ORDER — ATORVASTATIN CALCIUM 10 MG/1
TABLET, FILM COATED ORAL
Qty: 90 TABLET | Refills: 0 | Status: SHIPPED | OUTPATIENT
Start: 2018-06-29 | End: 2018-09-20 | Stop reason: SDUPTHER

## 2018-06-29 RX ORDER — PROPRANOLOL HYDROCHLORIDE 80 MG/1
TABLET ORAL
Qty: 30 TABLET | Refills: 0 | Status: SHIPPED | OUTPATIENT
Start: 2018-06-29 | End: 2018-08-11 | Stop reason: SDUPTHER

## 2018-07-31 RX ORDER — OMEPRAZOLE 20 MG/1
20 CAPSULE, DELAYED RELEASE ORAL DAILY
Qty: 30 CAPSULE | Refills: 2 | Status: SHIPPED | OUTPATIENT
Start: 2018-07-31 | End: 2018-10-19 | Stop reason: SDUPTHER

## 2018-07-31 NOTE — TELEPHONE ENCOUNTER
Health Maintenance   Topic Date Due    HIV screen  09/21/1987    DTaP/Tdap/Td vaccine (1 - Tdap) 09/21/1991    Lipid screen  09/21/2012    Diabetes screen  09/21/2012    Cervical cancer screen  10/03/2018 (Originally 9/21/1993)    Flu vaccine (1) 09/01/2018             (applicable per patient's age: Cancer Screenings, Depression Screening, Fall Risk Screening, Immunizations)    AST (U/L)   Date Value   07/18/2017 14     ALT (U/L)   Date Value   07/18/2017 18     BUN (mg/dL)   Date Value   07/18/2017 13      (goal A1C is < 7)   (goal LDL is <100) need 30-50% reduction from baseline     BP Readings from Last 3 Encounters:   01/11/18 102/70   10/03/17 105/80   08/21/17 (!) 139/91    (goal /80)      All Future Testing planned in [facility]:  Lab Frequency Next Occurrence   Topiramate Level Once 08/21/2018   CBC Auto Differential Once 08/08/2018   Comprehensive Metabolic Panel Once 50/51/1455   Microalbumin, Ur Once 08/08/2018   Urinalysis Once 08/08/2018   Lipid Panel Once 08/08/2018   Hemoglobin A1C Once 08/08/2018   TSH With Reflex Ft4 Once 08/08/2018   CBC Auto Differential Once 08/08/2018   Comprehensive Metabolic Panel Once 20/50/4010   Urinalysis Once 08/08/2018   Microalbumin, Ur Once 08/08/2018   EKG 12 Lead Once 10/03/2018       Next Visit Date:  No future appointments.          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

## 2018-08-13 RX ORDER — PROPRANOLOL HYDROCHLORIDE 80 MG/1
TABLET ORAL
Qty: 30 TABLET | Refills: 0 | Status: SHIPPED | OUTPATIENT
Start: 2018-08-13 | End: 2020-10-13

## 2018-09-20 RX ORDER — ATORVASTATIN CALCIUM 10 MG/1
TABLET, FILM COATED ORAL
Qty: 30 TABLET | Refills: 0 | Status: SHIPPED | OUTPATIENT
Start: 2018-09-20 | End: 2020-04-07

## 2018-09-20 NOTE — TELEPHONE ENCOUNTER
Last visit: 1/11/18  Last Med refill: 6/29/18    Next Visit Date:  No future appointments.     Health Maintenance   Topic Date Due    HIV screen  09/21/1987    DTaP/Tdap/Td vaccine (1 - Tdap) 09/21/1991    Lipid screen  09/21/2012    Flu vaccine (1) 09/01/2018    Cervical cancer screen  10/03/2018 (Originally 9/21/1993)       No results found for: LABA1C          ( goal A1C is < 7)   No results found for: LABMICR  No results found for: LDLCHOLESTEROL, LDLCALC    (goal LDL is <100)   AST (U/L)   Date Value   07/18/2017 14     ALT (U/L)   Date Value   07/18/2017 18     BUN (mg/dL)   Date Value   07/18/2017 13     BP Readings from Last 3 Encounters:   01/11/18 102/70   10/03/17 105/80   08/21/17 (!) 139/91          (goal 120/80)    All Future Testing planned in [facility]  Lab Frequency Next Occurrence   CBC Auto Differential Once 10/03/2018   Comprehensive Metabolic Panel Once 74/05/9685   Microalbumin, Ur Once 10/03/2018   Urinalysis Once 10/03/2018   Lipid Panel Once 10/03/2018   Hemoglobin A1C Once 10/03/2018   TSH With Reflex Ft4 Once 10/03/2018   CBC Auto Differential Once 10/03/2018   Comprehensive Metabolic Panel Once 21/03/5461   Urinalysis Once 10/03/2018   Microalbumin, Ur Once 10/03/2018   EKG 12 Lead Once 10/03/2018               Patient Active Problem List:     Acute appendicitis     Abnormal ECG     Acute appendicitis with generalized peritonitis     Hypertension     Tobacco abuse     Abnormal cardiovascular stress test     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

## 2018-09-24 RX ORDER — ACETAMINOPHEN/DIPHENHYDRAMINE 500MG-25MG
TABLET ORAL
Qty: 90 TABLET | Refills: 3 | Status: SHIPPED | OUTPATIENT
Start: 2018-09-24

## 2018-09-24 NOTE — TELEPHONE ENCOUNTER
Health Maintenance   Topic Date Due    HIV screen  09/21/1987    DTaP/Tdap/Td vaccine (1 - Tdap) 09/21/1991    Lipid screen  09/21/2012    Flu vaccine (1) 09/01/2018    Cervical cancer screen  10/03/2018 (Originally 9/21/1993)             (applicable per patient's age: Cancer Screenings, Depression Screening, Fall Risk Screening, Immunizations)    AST (U/L)   Date Value   07/18/2017 14     ALT (U/L)   Date Value   07/18/2017 18     BUN (mg/dL)   Date Value   07/18/2017 13      (goal A1C is < 7)   (goal LDL is <100) need 30-50% reduction from baseline     BP Readings from Last 3 Encounters:   01/11/18 102/70   10/03/17 105/80   08/21/17 (!) 139/91    (goal /80)      All Future Testing planned in [facility]:  Lab Frequency Next Occurrence   CBC Auto Differential Once 10/03/2018   Comprehensive Metabolic Panel Once 80/16/3222   Microalbumin, Ur Once 10/03/2018   Urinalysis Once 10/03/2018   Lipid Panel Once 10/03/2018   Hemoglobin A1C Once 10/03/2018   TSH With Reflex Ft4 Once 10/03/2018   CBC Auto Differential Once 10/03/2018   Comprehensive Metabolic Panel Once 76/34/4230   Urinalysis Once 10/03/2018   Microalbumin, Ur Once 10/03/2018   EKG 12 Lead Once 10/03/2018       Next Visit Date:  No future appointments.          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

## 2018-10-01 RX ORDER — DULOXETIN HYDROCHLORIDE 60 MG/1
CAPSULE, DELAYED RELEASE ORAL
Qty: 90 CAPSULE | Refills: 1 | Status: SHIPPED | OUTPATIENT
Start: 2018-10-01 | End: 2019-04-15 | Stop reason: SDUPTHER

## 2018-10-19 DIAGNOSIS — K21.9 GASTROESOPHAGEAL REFLUX DISEASE WITHOUT ESOPHAGITIS: Primary | ICD-10-CM

## 2018-10-19 RX ORDER — OMEPRAZOLE 20 MG/1
CAPSULE, DELAYED RELEASE ORAL
Qty: 90 CAPSULE | Refills: 0 | Status: SHIPPED | OUTPATIENT
Start: 2018-10-19 | End: 2019-01-24 | Stop reason: SDUPTHER

## 2018-10-19 NOTE — TELEPHONE ENCOUNTER
Health Maintenance   Topic Date Due    HIV screen  09/21/1987    DTaP/Tdap/Td vaccine (1 - Tdap) 09/21/1991    Cervical cancer screen  09/21/1993    Lipid screen  09/21/2012    Flu vaccine (1) 09/01/2018             (applicable per patient's age: Cancer Screenings, Depression Screening, Fall Risk Screening, Immunizations)    AST (U/L)   Date Value   07/18/2017 14     ALT (U/L)   Date Value   07/18/2017 18     BUN (mg/dL)   Date Value   07/18/2017 13      (goal A1C is < 7)   (goal LDL is <100) need 30-50% reduction from baseline     BP Readings from Last 3 Encounters:   01/11/18 102/70   10/03/17 105/80   08/21/17 (!) 139/91    (goal /80)      All Future Testing planned in [facility]:  Lab Frequency Next Occurrence       Next Visit Date:  No future appointments.          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

## 2019-01-04 RX ORDER — AMITRIPTYLINE HYDROCHLORIDE 25 MG/1
TABLET, FILM COATED ORAL
Qty: 30 TABLET | Refills: 3 | Status: SHIPPED | OUTPATIENT
Start: 2019-01-04 | End: 2021-03-24

## 2019-01-24 DIAGNOSIS — K21.9 GASTROESOPHAGEAL REFLUX DISEASE WITHOUT ESOPHAGITIS: ICD-10-CM

## 2019-01-24 RX ORDER — OMEPRAZOLE 20 MG/1
CAPSULE, DELAYED RELEASE ORAL
Qty: 90 CAPSULE | Refills: 0 | Status: SHIPPED | OUTPATIENT
Start: 2019-01-24 | End: 2019-04-23 | Stop reason: SDUPTHER

## 2019-04-23 DIAGNOSIS — K21.9 GASTROESOPHAGEAL REFLUX DISEASE WITHOUT ESOPHAGITIS: ICD-10-CM

## 2019-04-23 RX ORDER — OMEPRAZOLE 20 MG/1
CAPSULE, DELAYED RELEASE ORAL
Qty: 90 CAPSULE | Refills: 0 | Status: SHIPPED | OUTPATIENT
Start: 2019-04-23

## 2019-04-23 NOTE — TELEPHONE ENCOUNTER
Health Maintenance   Topic Date Due    HIV screen  09/21/1987    DTaP/Tdap/Td vaccine (1 - Tdap) 09/21/1991    Cervical cancer screen  09/21/1993    Lipid screen  09/21/2012    Flu vaccine (Season Ended) 09/01/2019    Pneumococcal 0-64 years Vaccine  Aged Out             (applicable per patient's age: Cancer Screenings, Depression Screening, Fall Risk Screening, Immunizations)    AST (U/L)   Date Value   07/18/2017 14     ALT (U/L)   Date Value   07/18/2017 18     BUN (mg/dL)   Date Value   07/18/2017 13      (goal A1C is < 7)   (goal LDL is <100) need 30-50% reduction from baseline     BP Readings from Last 3 Encounters:   01/11/18 102/70   10/03/17 105/80   08/21/17 (!) 139/91    (goal /80)      All Future Testing planned in [facility]:  Lab Frequency Next Occurrence       Next Visit Date:  No future appointments.          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

## 2019-05-23 ENCOUNTER — HOSPITAL ENCOUNTER (EMERGENCY)
Age: 49
Discharge: HOME | End: 2019-05-23
Payer: MEDICAID

## 2019-05-23 VITALS
TEMPERATURE: 97.6 F | DIASTOLIC BLOOD PRESSURE: 92 MMHG | HEART RATE: 88 BPM | SYSTOLIC BLOOD PRESSURE: 139 MMHG | RESPIRATION RATE: 14 BRPM | OXYGEN SATURATION: 96 %

## 2019-05-23 DIAGNOSIS — J02.9 ACUTE PHARYNGITIS, UNSPECIFIED ETIOLOGY: Primary | ICD-10-CM

## 2019-05-23 LAB
DIRECT EXAM: NORMAL
Lab: NORMAL
SPECIMEN DESCRIPTION: NORMAL

## 2019-05-23 PROCEDURE — 87880 STREP A ASSAY W/OPTIC: CPT

## 2019-05-23 PROCEDURE — 6360000002 HC RX W HCPCS: Performed by: PHYSICIAN ASSISTANT

## 2019-05-23 PROCEDURE — 99283 EMERGENCY DEPT VISIT LOW MDM: CPT

## 2019-05-23 RX ORDER — DEXAMETHASONE SODIUM PHOSPHATE 10 MG/ML
10 INJECTION INTRAMUSCULAR; INTRAVENOUS ONCE
Status: COMPLETED | OUTPATIENT
Start: 2019-05-23 | End: 2019-05-23

## 2019-05-23 RX ORDER — CARBAMAZEPINE 200 MG/1
200 TABLET ORAL 3 TIMES DAILY
COMMUNITY

## 2019-05-23 RX ADMIN — DEXAMETHASONE SODIUM PHOSPHATE 10 MG: 10 INJECTION INTRAMUSCULAR; INTRAVENOUS at 17:40

## 2019-05-23 NOTE — ED PROVIDER NOTES
[facility] ED  EMERGENCY DEPARTMENT ENCOUNTER      Pt Name: Alexa Wu  MRN: [id number]  Birthdate 1970  Date of evaluation: 5/23/2019  Provider: Zafar Romero PA-C    CHIEF COMPLAINT       Chief Complaint   Patient presents with    Pharyngitis     started tuesday. patient was seen at [facility] on tuesday she is worse today       [address] is a 57 y.o. female who presents to the emergency department from home sore throat for the past 2 days seen at [facility] to days ago strep screen was negative. Recommended supportive treatment states it is more swollen and painful with painful talking and now has white spots on her tonsils denies difficulty swallowing or breathing. Denies chest pain shortness of breath or fever. Triage notes and Nursing notes were reviewed by myself. Any discrepancies are addressed above. PAST MEDICAL HISTORY     Past Medical History:   Diagnosis Date    Arthritis     Cerebral palsy ([address]     Fibromyalgia     GERD (gastroesophageal reflux disease)     H/O cardiac catheterization 1/21/15    EF 55%. LMCA: Normal 0% stenosis. LAD: Mild irregularities 10-20%. LCx: Normal 0% stenosis. RCA: Normal 0% stenosis.  Headache(784.0)     History of cardiovascular stress test 1/14/15    Abnormal myocardial perfusion study. Small - moderate perfusion defect of mild - moderate intensity in the anterior and anteroseptal regions during stress imaging.      Hypertension     Seizures ([address]        SURGICAL HISTORY       Past Surgical History:   Procedure Laterality Date    APPENDECTOMY  12/2/14    CARDIAC CATHETERIZATION Right     radial/femoral per / [facility]/ angioseal right femoral    HYSTERECTOMY      [facility] LEFT HEART CATH,PERCUTANEOUS Right 01/21/15    Cardiac Cath, Left Heart, right wrist/ right femoral with angioseal    TUBAL LIGATION         CURRENT MEDICATIONS       Previous Medications Needs    Financial resource strain: None    Food insecurity:     Worry: None     Inability: None    Transportation needs:     Medical: None     Non-medical: None   Tobacco Use    Smoking status: Former Smoker     Last attempt to quit: 2010     Years since quittin.9    Smokeless tobacco: Never Used   Substance and Sexual Activity    Alcohol use: No     Alcohol/week: 0.0 oz     Comment: occasional    Drug use: No    Sexual activity: None   Lifestyle    Physical activity:     Days per week: None     Minutes per session: None    Stress: None   Relationships    Social connections:     Talks on phone: None     Gets together: None     Attends Gnosticist service: None     Active member of club or organization: None     Attends meetings of clubs or organizations: None     Relationship status: None    Intimate partner violence:     Fear of current or ex partner: None     Emotionally abused: None     Physically abused: None     Forced sexual activity: None   Other Topics Concern    None   Social History Narrative    None       REVIEW OF SYSTEMS     Review of Systems    Except as noted above the remainder of the review of systems was reviewed and is negative. PHYSICAL EXAM    (up to 7 for level 4, 8 or more for level 5)     ED Triage Vitals [19 1644]   BP Temp Temp Source Pulse Resp SpO2 Height Weight   (!) 141/91 97.6 °F (36.4 °C) Tympanic 96 14 96 % -- --       Physical Exam  Active and oriented ×3. Nontoxic. No acute distress. Well-hydrated. Head is atraumatic, facies symmetrical.  Neck is supple with no adenopathy  Throat with 1+ erythematous tonsils small amount of white exudates bilaterally uvula midline airway patent no trismus no elevation for the mouth. No sign of tonsillar abscess noted  Respirations nonlabored. Lungs clear to auscultation  Skin free of any obvious rashes or lesions. Extremities without edema. Good affect. Pleasant patient.     DIAGNOSTIC RESULTS LABS:  Labs Reviewed   STREP SCREEN GROUP A THROAT       All other labs were within normal range or not returned as of this dictation. EMERGENCY DEPARTMENT COURSE andMedical Decision Making:     MDM/   Due to the significant false negative rate of strep screens the test was repeated today and continues to be negative. We will treat as viral pharyngitis. I do recommend a single dose of Decadron for this patient to assist with inflammation and pain. No sign of Nigel angina or tonsillar abscess noted. Strict returnprecautions and follow up instructions were discussed with the patient with which the patient agrees    ED Medications administered this visit:    Medications   dexamethasone (DECADRON) injection 10 mg (has no administration in time range)       CONSULTS: (None if blank)  None    Procedures: (None if blank)       CLINICAL       1.  Acute pharyngitis, unspecified etiology          DISPOSITION/PLAN   DISPOSITION Discharge - Pending Orders Complete 05/23/2019 05:34:15 PM      PATIENT REFERRED TO:  Brooklynn Leonardo MD  [address] [place] [address]  [phone]    In 1 week  Recheck blood pressure      DISCHARGE MEDICATIONS:  New Prescriptions    No medications on file              (Please note that portions of this note were completed with a voice recognition program.  Efforts were made to edit the dictations but occasionallywords are mis-transcribed.)      Obinna Ham PA-C (electronically signed)  Attending Emergency Department Provider         Jim Aranda II, PA-C  05/23/19 [id number]

## 2019-09-11 DIAGNOSIS — K21.9 GASTROESOPHAGEAL REFLUX DISEASE WITHOUT ESOPHAGITIS: ICD-10-CM

## 2019-09-11 RX ORDER — OMEPRAZOLE 20 MG/1
CAPSULE, DELAYED RELEASE ORAL
Qty: 90 CAPSULE | Refills: 0 | OUTPATIENT
Start: 2019-09-11

## 2020-04-07 ENCOUNTER — HOSPITAL ENCOUNTER (EMERGENCY)
Age: 50
Discharge: HOME | End: 2020-04-07
Attending: EMERGENCY MEDICINE
Payer: MEDICAID

## 2020-04-07 ENCOUNTER — APPOINTMENT (OUTPATIENT)
Dept: GENERAL RADIOLOGY | Age: 50
End: 2020-04-07
Payer: MEDICAID

## 2020-04-07 VITALS
RESPIRATION RATE: 14 BRPM | TEMPERATURE: 97.2 F | HEART RATE: 93 BPM | DIASTOLIC BLOOD PRESSURE: 122 MMHG | OXYGEN SATURATION: 97 % | SYSTOLIC BLOOD PRESSURE: 172 MMHG

## 2020-04-07 PROCEDURE — 6370000000 HC RX 637 (ALT 250 FOR IP): Performed by: EMERGENCY MEDICINE

## 2020-04-07 PROCEDURE — 73630 X-RAY EXAM OF FOOT: CPT

## 2020-04-07 PROCEDURE — 99283 EMERGENCY DEPT VISIT LOW MDM: CPT

## 2020-04-07 RX ORDER — NAPROXEN SODIUM 550 MG/1
550 TABLET ORAL ONCE
Status: COMPLETED | OUTPATIENT
Start: 2020-04-07 | End: 2020-04-07

## 2020-04-07 RX ADMIN — NAPROXEN SODIUM 550 MG: 550 TABLET, FILM COATED ORAL at 18:31

## 2020-04-07 ASSESSMENT — PAIN DESCRIPTION - ORIENTATION: ORIENTATION: LEFT

## 2020-04-07 ASSESSMENT — PAIN DESCRIPTION - LOCATION: LOCATION: FOOT

## 2020-04-07 ASSESSMENT — PAIN SCALES - GENERAL
PAINLEVEL_OUTOF10: 10
PAINLEVEL_OUTOF10: 8

## 2020-04-07 NOTE — ED PROVIDER NOTES
[facility] ED  eMERGENCY dEPARTMENT eNCOUnter      Pt Name: Zohreh Sierra  MRN: [id number]  Birthdate 1970  Date of evaluation: 4/7/2020  Provider: Sofia Byrnes MD    CHIEF COMPLAINT     Chief Complaint   Patient presents with    Foot Pain     left foot pain. patient's dog stepped on her foot about two weeks ago. she is continuing to have pain in the area. HISTORY OF PRESENT ILLNESS    Kay Alves is a 54 y.o. female who presents to the emergency department for evaluation of left foot pain. Pain started approximately 2 weeks ago when her dog stepped on it. Pain is located to the top of her foot just behind her big toe. She rates the pain as moderate. She describes the pain as throbbing. Pain is worse with movement. PAST MEDICAL HISTORY     Past Medical History:   Diagnosis Date    Arthritis     Cerebral palsy ([address]     Fibromyalgia     GERD (gastroesophageal reflux disease)     H/O cardiac catheterization 1/21/15    EF 55%. LMCA: Normal 0% stenosis. LAD: Mild irregularities 10-20%. LCx: Normal 0% stenosis. RCA: Normal 0% stenosis.  Headache(784.0)     History of cardiovascular stress test 1/14/15    Abnormal myocardial perfusion study. Small - moderate perfusion defect of mild - moderate intensity in the anterior and anteroseptal regions during stress imaging.      Hypertension     Seizures ([address]        SURGICAL HISTORY       Past Surgical History:   Procedure Laterality Date    APPENDECTOMY  12/2/14    CARDIAC CATHETERIZATION Right     radial/femoral per / Collins/ angioseal right femoral    HYSTERECTOMY      [place] LEFT HEART CATH,PERCUTANEOUS Right 01/21/15    Cardiac Cath, Left Heart, right wrist/ right femoral with angioseal    TUBAL LIGATION         CURRENT MEDICATIONS       Discharge Medication List as of 4/7/2020  6:50 PM      CONTINUE these medications which have NOT CHANGED    Details   carBAMazepine (TEGRETOL) 200 MG tablet Take 200 mg by mouth General: There is no distension. Palpations: Abdomen is soft. Musculoskeletal:         General: No tenderness. Feet:    Skin:     General: Skin is warm and dry. Findings: No erythema. Neurological:      Mental Status: She is alert and oriented to person, place, and time. DIAGNOSTIC RESULTS     RADIOLOGY: (none if blank)   Interpretation per theRadiologist below, if available at the time of this note:    XR FOOT LEFT (MIN 3 VIEWS)   Final Result   1. Mild soft tissue swelling at the dorsal forefoot. 2. No acute fracture or dislocation. 3. Mild plantar calcaneal spur. LABS:  Labs Reviewed - No data to display    All other labs were within normal range or not returned as of this dictation. EMERGENCY DEPARTMENT COURSE and Medical Decision Making:     X-ray shows no acute abnormality. Patient was treated with naproxen and ice. Patient instructed follow-up with PCP next 2 to 3 days. ED Medications administered this visit:    Medications   naproxen sodium (ANAPROX) tablet 550 mg (550 mg Oral Given 4/7/20 1363)       CLINICAL IMPRESSION      1.  Contusion of dorsum of foot          DISPOSITION/PLAN   DISPOSITION Decision To Discharge 04/07/2020 06:50:22 PM      PATIENT REFERRED TO:  Kerrie Duran MD  [address] [place] [phone]  [address] [place] [address] [address]  [phone]    Schedule an appointment as soon as possible for a visit in 3 days        DISCHARGE MEDICATIONS:  Discharge Medication List as of 4/7/2020  6:50 PM               Chrissy Ambriz MD (electronically signed)  AttendingEmergency Department Provider            Chrissy Ambriz MD  04/07/20 [id number]

## 2020-08-19 ENCOUNTER — TELEPHONE (OUTPATIENT)
Dept: PAIN MANAGEMENT | Age: 50
End: 2020-08-19

## 2020-08-19 NOTE — TELEPHONE ENCOUNTER
..  Initial Intake for Pain Management Clinic:  Darell Odell (48 y.o., female)    : 1970     Which Provider sent Referral [facility]   What is your address (include city,state) [place] [phone]   Purpose for Referral Back Pain and Trigger Finger   Will all medication management and therapy for pain be managed by Dr. Dewayne Finney? Dr. Dewayne Finney will treat all issues       Chief Complaint Lower back pain; Tigger finger rt middle finger When did your pain start? Back pain-awhile, long time,  getting worse,  Since osteoporosis  Trigger finger--couple weeks ago   What is your pain level today?  (0-10)     6 How did you find out about us or who sent you? [facility]   What do you feel caused your pain? Back-working slipped disc. Spondolyosis, osteoporosis. Trigger finger just came on  What words would you use to describe your pain? Unsure Sharp, achy, times where she get stuck and is not able to move, times she cam barley walk. What makes your pain better?   occ if I lay down it helps, use a certain brand of muscle relaxer,  What makes your pain worse? Bending over and doing things, walking, sitting   Have you had any MRI's or Xrays? **If yes and they were from somewhere other than a [facility]) facility - please bring copies of reports with you Yes [facility] Have you had any other tests done? **If yes and they were from somewhere other than a [facility]) facility - please bring copies of reports with you No   Have you had any surgeries specific to your pain? No  Back does pop and makes it feel better. Have you had any shots/injections for your pain? Yes steroids   Have you tried any therapies for your pain? If yes, what?  (I.e. PT, OT, Massage, Aqua, Chiropractic, TENS)   No  What doctors have you seen for your pain and what did they do for you? None since    What medications have you tried? Ibuprofen, tylenol arthritis,  Do you have any medication allergies?  Pcn, zilópez, radine (zantac)   Do you drink alcohol? no Do you smoke? no   Any history of substance abuse?   no Are you employed? no   What do you want to this treatment to do for you?  (I.E. Increase activity, return to work, decrease pain) Decrease pain. Please remind patient to bring ALL medications into clinic appointment.

## 2020-10-13 ENCOUNTER — HOSPITAL ENCOUNTER (OUTPATIENT)
Dept: PAIN MANAGEMENT | Age: 50
Discharge: HOME | End: 2020-10-13
Payer: MEDICAID

## 2020-10-13 VITALS
RESPIRATION RATE: 20 BRPM | TEMPERATURE: 97.5 F | WEIGHT: 197.7 LBS | DIASTOLIC BLOOD PRESSURE: 86 MMHG | HEIGHT: 60 IN | SYSTOLIC BLOOD PRESSURE: 134 MMHG | BODY MASS INDEX: 38.82 KG/M2 | HEART RATE: 97 BPM

## 2020-10-13 PROBLEM — E66.812 CLASS 2 OBESITY WITH BODY MASS INDEX (BMI) OF 38.0 TO 38.9 IN ADULT: Status: ACTIVE | Noted: 2020-06-16

## 2020-10-13 PROBLEM — G89.29 CHRONIC BILATERAL LOW BACK PAIN: Status: ACTIVE | Noted: 2018-01-23

## 2020-10-13 PROBLEM — M46.1 SACROILIITIS, NOT ELSEWHERE CLASSIFIED (HCC): Status: ACTIVE | Noted: 2017-11-20

## 2020-10-13 PROBLEM — M47.817 LUMBOSACRAL SPONDYLOSIS WITHOUT MYELOPATHY: Status: ACTIVE | Noted: 2018-01-23

## 2020-10-13 PROBLEM — M54.50 CHRONIC BILATERAL LOW BACK PAIN: Status: ACTIVE | Noted: 2018-01-23

## 2020-10-13 PROBLEM — E66.9 CLASS 2 OBESITY WITH BODY MASS INDEX (BMI) OF 38.0 TO 38.9 IN ADULT: Status: ACTIVE | Noted: 2020-06-16

## 2020-10-13 PROCEDURE — 99214 OFFICE O/P EST MOD 30 MIN: CPT

## 2020-10-13 PROCEDURE — 97813 ACUP 1/> W/ESTIM 1ST 15 MIN: CPT | Performed by: ANESTHESIOLOGY

## 2020-10-13 PROCEDURE — 99204 OFFICE O/P NEW MOD 45 MIN: CPT | Performed by: ANESTHESIOLOGY

## 2020-10-13 PROCEDURE — 97814 ACUP 1/> W/ESTIM EA ADDL 15: CPT | Performed by: ANESTHESIOLOGY

## 2020-10-13 RX ORDER — DULOXETIN HYDROCHLORIDE 30 MG/1
30 CAPSULE, DELAYED RELEASE ORAL DAILY
COMMUNITY

## 2020-10-13 RX ORDER — PHENOL 1.4 %
1 AEROSOL, SPRAY (ML) MUCOUS MEMBRANE DAILY
COMMUNITY
End: 2021-03-24

## 2020-10-13 ASSESSMENT — PAIN DESCRIPTION - ONSET: ONSET: AWAKENED FROM SLEEP

## 2020-10-13 ASSESSMENT — PAIN SCALES - GENERAL: PAINLEVEL_OUTOF10: 8

## 2020-10-13 ASSESSMENT — PAIN DESCRIPTION - FREQUENCY: FREQUENCY: CONTINUOUS

## 2020-10-13 ASSESSMENT — PAIN DESCRIPTION - DESCRIPTORS: DESCRIPTORS: SHARP;ACHING;CONSTANT

## 2020-10-13 ASSESSMENT — PAIN DESCRIPTION - PROGRESSION: CLINICAL_PROGRESSION: GRADUALLY WORSENING

## 2020-10-13 ASSESSMENT — PAIN - FUNCTIONAL ASSESSMENT: PAIN_FUNCTIONAL_ASSESSMENT: PREVENTS OR INTERFERES SOME ACTIVE ACTIVITIES AND ADLS

## 2020-10-13 ASSESSMENT — PAIN DESCRIPTION - LOCATION: LOCATION: BACK

## 2020-10-13 ASSESSMENT — PAIN DESCRIPTION - PAIN TYPE: TYPE: CHRONIC PAIN

## 2020-10-13 ASSESSMENT — PAIN DESCRIPTION - ORIENTATION: ORIENTATION: LOWER

## 2020-10-13 NOTE — PROGRESS NOTES
10/13/20   Drug Abuse Screening Test - DAST-10  731  These questions refer to the past 12 Months    Never=0 Seldom=1 Sometimes=2 Often=3 Very Often=4   1. How often do you have mood swings?       x   2. How often have you felt a need for higher doses  of medication to treat your pain?   x     3. How often have you felt impatient with your  doctors?  x      4. How often have you felt that things are just too  overwhelming that you can't handle them? x       5. How often is there tension in the home? x       6. How often have you counted pain pills to see  how many are remaining? x       7. How often have you been concerned that people  will  you for taking pain medication? x       8. How often do you feel bored?   x     9. How often have you taken more pain medication  than you were supposed to? x       10. How often have you worried about being left  alone?     x   11. How often have you felt a craving for  medication? x       12. How often have others expressed concern over  your use of medication? x       13. How often have any of your close friends had a  problem with alcohol or drugs? x       14. How often have others told you that you had a  bad temper? x       15. How often have you felt consumed by the need  to get pain medication? x       16. How often have you run out of pain medication  early? x       17. How often have others kept you from getting  what you deserve? x       18. How often, in your lifetime, have you had legal  problems or been arrested?  x      19. How often have you attended an [place] [id number] or   meeting? x       20. How often have you been in an argument that  was so out of control that someone got hurt? x       21. How often have you been sexually abused?  x      22. How often have others suggested that you have  a drug or alcohol problem? x       23. How often have you had to borrow pain  medications from your family or friends? x       24.  How often have you been treated for an alcohol  or drug problem? x         TOTAL SCORE: 15    Sum of Questions SOAPP-R Indication   > or = 18 +   < 18 -

## 2020-10-13 NOTE — PROGRESS NOTES
tablet by mouth NIGHTLY 30 tablet 3    RA ASPIRIN EC 81 MG EC tablet take 1 tablet by mouth once daily 90 tablet 3    SUMAtriptan (IMITREX) 100 MG tablet take 1 tablet by mouth AT ONSET headache MAY REPEAT IN 2 HOURS. NO MORE THAN 2 PER DAY 4 DAYS OUT OF A WEEK. 8 tablet 0    acetaminophen (TYLENOL) 500 MG tablet Take 500 mg by mouth every 6 hours as needed for Pain      albuterol sulfate  (90 BASE) MCG/ACT inhaler Inhale 2 puffs into the lungs every 4 hours as needed for Wheezing       No current facility-administered medications for this encounter. Allergies   Allergen Reactions    Ciprofloxacin     Penicillins     Ranitidine Hcl        Past Medical History:   Diagnosis Date    Arthritis     Cerebral palsy (HCC)     Fibromyalgia     GERD (gastroesophageal reflux disease)     H/O cardiac catheterization 1/21/15    EF 55%. LMCA: Normal 0% stenosis. LAD: Mild irregularities 10-20%. LCx: Normal 0% stenosis. RCA: Normal 0% stenosis.  Headache(784.0)     History of cardiovascular stress test 1/14/15    Abnormal myocardial perfusion study. Small - moderate perfusion defect of mild - moderate intensity in the anterior and anteroseptal regions during stress imaging.      Hypertension     Seizures ([facility] [address]        Past Surgical History:   Procedure Laterality Date    APPENDECTOMY  12/2/14    CARDIAC CATHETERIZATION Right     radial/femoral per / Collins/ angioseal right femoral    HYSTERECTOMY      [place] LEFT HEART CATH,PERCUTANEOUS Right 01/21/15    Cardiac Cath, Left Heart, right wrist/ right femoral with angioseal    TUBAL LIGATION         Family History   Problem Relation Age of Onset    Heart Disease Maternal Grandmother     Cancer Maternal Grandmother     Heart Disease Maternal Grandfather     Cancer Maternal Grandfather        Social History     Socioeconomic History    Marital status: Legally      Spouse name: Not on file    Number of children: Not on file    Years of education: Not on file    Highest education level: Not on file   Occupational History    Not on file   Social Needs    Financial resource strain: Not on file    Food insecurity     Worry: Not on file     Inability: Not on file    Transportation needs     Medical: Not on file     Non-medical: Not on file   Tobacco Use    Smoking status: Former Smoker     Last attempt to quit: 6/5/2010     Years since quitting: 10.3    Smokeless tobacco: Never Used   Substance and Sexual Activity    Alcohol use: No     Alcohol/week: 0.0 standard drinks     Comment: occasional    Drug use: No    Sexual activity: Not on file   Lifestyle    Physical activity     Days per week: Not on file     Minutes per session: Not on file    Stress: Not on file   Relationships    Social connections     Talks on phone: Not on file     Gets together: Not on file     Attends [employer] service: Not on file     Active member of club or organization: Not on file     Attends meetings of clubs or organizations: Not on file     Relationship status: Not on file    Intimate partner violence     Fear of current or ex partner: Not on file     Emotionally abused: Not on file     Physically abused: Not on file     Forced sexual activity: Not on file   Other Topics Concern    Not on file   Social History Narrative    Not on file       Vitals:    10/13/20 1231   BP: 134/86   Pulse: 97   Resp: 20   Temp: 97.5 °F (36.4 °C)     Body mass index is 38.61 kg/m². Physical Exam  Constitutional:       General: She is not in acute distress. Appearance: She is obese. She is not diaphoretic. HENT:      Head: Normocephalic. Right Ear: External ear normal.      Left Ear: External ear normal.      Nose: Nose normal.   Eyes:      Pupils: Pupils are equal, round, and reactive to light. Neck:      Musculoskeletal: Normal range of motion and neck supple. Muscular tenderness present. Cardiovascular:      Rate and Rhythm: Normal rate.       Heart sounds: Normal heart sounds. Pulmonary:      Effort: Pulmonary effort is normal.      Breath sounds: Normal breath sounds. Abdominal:      Palpations: Abdomen is soft. Musculoskeletal:         General: Tenderness present. Skin:     General: Skin is warm and dry. Neurological:      Mental Status: She is alert and oriented to person, place, and time. Psychiatric:         Behavior: Behavior normal.       Right Hip Exam     Tenderness   The patient is experiencing tenderness in the posterior. Range of Motion   Abduction: normal   Adduction: normal   Extension: normal   Flexion: abnormal   External rotation: abnormal     Muscle Strength   Abduction: 3/5   Adduction: 3/5   Flexion: 3/5     Tests   STEPHANE: positive      Left Hip Exam     Range of Motion   Abduction: normal   Adduction: normal   Flexion: abnormal   External rotation: abnormal   Internal rotation: abnormal     Muscle Strength   Abduction: 3/5   Adduction: 3/5   Flexion: 3/5     Tests   STEPHANE: positive  Marie: positive      Back Exam     Tenderness   The patient is experiencing tenderness in the lumbar.     Range of Motion   Extension: abnormal   Flexion: abnormal   Lateral bend left: abnormal   Rotation right: abnormal   Rotation left: abnormal     Muscle Strength   Right Quadriceps:  2/5   Left Quadriceps:  2/5   Right Hamstrings:  2/5   Left Hamstrings:  2/5     Tests   Straight leg raise right: positive  Straight leg raise left: positive    Other   Toe walk: abnormal  Heel walk: abnormal            LABS:    Lab Results   Component Value Date/Time    AMPHSUR NEGATIVE 11/13/2016 04:10 PM    BARBSCNU NEGATIVE 11/13/2016 04:10 PM    LABBENZ POSITIVE (A) 11/13/2016 04:10 PM    COCAINEMETUR NEGATIVE 11/13/2016 04:10 PM    LABMETH NEGATIVE 11/13/2016 04:10 PM    OPIAU NEGATIVE 11/13/2016 04:10 PM    PHENCYCU NEGATIVE 11/13/2016 04:10 PM    PPXUR NEGATIVE 11/13/2016 04:10 PM    CANSU NEGATIVE 11/13/2016 04:10 PM    OXTCOSU NEGATIVE 11/13/2016 04:10 PM METAMPU POSITIVE (A) 11/13/2016 04:10 PM    TRICYUR NEGATIVE 11/13/2016 04:10 PM    MDMA NOT REPORTED 11/13/2016 04:10 PM    BUPRENUR NEGATIVE 11/13/2016 04:10 PM    LABTEST NOT REPORTED 11/13/2016 04:10 PM       No results found for: MG, VITD25, TSH, THYROXINE, CRP    No results found for: CHIP, MPO, PR3, C5, HEPCAB    IMAGING:    No results found. DIAGNOSIS:  Principal Problem:    Chronic bilateral low back pain  Active Problems:    Hypertension    Class 2 obesity with body mass index (BMI) of 38.0 to 38.9 in adult    Fibromyalgia    Sacroiliitis, not elsewhere classified ([facility]  Resolved Problems:    * No resolved hospital problems. *         Controlled Substances Monitoring:     No flowsheet data found. TREATMENT  Meditation routine was discussed  Breathing exercises and how to do them was discussed  Counseling for chronic pain was done  So we did bilateral LI 4 SI 3 triple heater 5 and liver 3 bladder 60 and gallbladder 41 with 150 Hz and 2 Hz and then I did microcurrent through the sacrum I did pyriformis release and I did sacral psoas release   Exercises were shown and how to do them was discussed    CHRONIC PAIN MANAGEMENT PATIENT  Pain Agreement Signed: Yes  Risk and benefits of opioid have been discussed with the patient.   Acupuncture Consent Signed: Yes  Non-opioid trials were inappropriate and provided insufficient pain relief    OARRS Attested: Yes  SOAP-R Completed: Yes  Total time spent with pt face to face  Time in:  Time out:  Consult  time  Acupuncture time:    (Please note that portions of this note were completed with a voice recognition program.  Efforts were made to edit the dictations but occasionallywords are mis-transcribed.)    Electronically signed by Melissa Anderson MD

## 2020-10-13 NOTE — PROGRESS NOTES
PAIN   Patient Name:  Luis Saenz  :   1970  Clinic Visit Date: 10/13/2020      REVIEW OF SYSTEMS    Constitutional Weight changes: absent, change in appetite: absent Fatigue: absent; Fevers : absent, Any recent hospitalizations:  absent   HEENT Ears: normal,  Visual disturbance: absent   Reespiratory Shortness of breath: present, choking:  absent, Cough: absent, Snoring : absent   Cardivascular Chest pain: absent, Leg swelling :absent, palpitations : absent, fainting : absent   GI Constipation: absent, Diarrhea: absent, Swallowing change: absent    Urinary frequency: absent, Urinary urgency: absent, Urinary incontinence: absent   Musculoskeletal Neck pain: absent, Back pain: present, Stiffness: present, Muscle pain: present, Joint pain: present, restless leg : absent   Dermatological Hair loss: absent, Skin changes: absent   Neurological Confusion: absent, Trouble concentrating: absent, Seizures: absent;  Memory loss: absent, balance problem: present, Dizziness: present, vertigo: absent, Weakness: absent, Numbness absent, Tremor: absent, Spasm: absent, involuntary movement: absent, Speech difficulty: absent, Headache: present, Light sensitivity: absent   Psychiatric Anxiety: present, Depression  present, drug abuse: absent, Hallucination: absent, mood disorder: absent, Suicidal ideations absent   Hematologic Abnormal bleeding: absent, Anemia: absent, Lymph gland changes: absent Clotting disorder: absent

## 2021-02-01 ENCOUNTER — APPOINTMENT (OUTPATIENT)
Dept: GENERAL RADIOLOGY | Age: 51
End: 2021-02-01
Payer: MEDICAID

## 2021-02-01 ENCOUNTER — HOSPITAL ENCOUNTER (EMERGENCY)
Age: 51
Discharge: HOME | End: 2021-02-01
Attending: EMERGENCY MEDICINE
Payer: MEDICAID

## 2021-02-01 VITALS
RESPIRATION RATE: 16 BRPM | TEMPERATURE: 97.9 F | OXYGEN SATURATION: 98 % | DIASTOLIC BLOOD PRESSURE: 102 MMHG | HEART RATE: 100 BPM | SYSTOLIC BLOOD PRESSURE: 148 MMHG

## 2021-02-01 DIAGNOSIS — S82.61XA CLOSED AVULSION FRACTURE OF LATERAL MALLEOLUS OF RIGHT FIBULA, INITIAL ENCOUNTER: Primary | ICD-10-CM

## 2021-02-01 PROCEDURE — 99283 EMERGENCY DEPT VISIT LOW MDM: CPT

## 2021-02-01 PROCEDURE — 73610 X-RAY EXAM OF ANKLE: CPT

## 2021-02-01 ASSESSMENT — ENCOUNTER SYMPTOMS
ABDOMINAL DISTENTION: 0
COUGH: 0
SHORTNESS OF BREATH: 0
RHINORRHEA: 0
VOMITING: 0
DIARRHEA: 0
WHEEZING: 0
CONSTIPATION: 0
NAUSEA: 0
SORE THROAT: 0

## 2021-02-01 ASSESSMENT — PAIN DESCRIPTION - LOCATION: LOCATION: ANKLE

## 2021-02-01 ASSESSMENT — PAIN DESCRIPTION - PAIN TYPE: TYPE: ACUTE PAIN

## 2021-02-02 NOTE — ED PROVIDER NOTES
[facility] ED  Emergency Department        Pt Name: Fabby Clarke  MRN: [id number]  Birthdate 1970  Date of evaluation: 2/1/21    CHIEF COMPLAINT       Chief Complaint   Patient presents with    Ankle Injury     twisted right ankle this evening. Recent ankle fx. HISTORY OF PRESENT ILLNESS  (Location/Symptom, Timing/Onset, Context/Setting, Quality, Duration, ModifyingFactors, Severity.)      Kay Willingham is a 52 y.o. female who presents with twisting her of right ankle. Has previous fracture previous to this foot a few months ago, has boot at home and put her foot in it today, and has been ambulating on foot int he boot today. PAST MEDICAL / SURGICAL / SOCIAL / FAMILY HISTORY      has a past medical history of Arthritis, Cerebral palsy ([address] Fibromyalgia, GERD (gastroesophageal reflux disease), H/O cardiac catheterization, Headache(784.0), History of cardiovascular stress test, Hypertension, and Seizures ([address] has a past surgical history that includes Hysterectomy; Tubal ligation; Appendectomy (12/2/14); Cardiac catheterization (Right); and pr left heart cath,percutaneous (Right, 01/21/15).        Social History     Socioeconomic History    Marital status: Legally      Spouse name: Not on file    Number of children: Not on file    Years of education: Not on file    Highest education level: Not on file   Occupational History    Not on file   Social Needs    Financial resource strain: Not on file    Food insecurity     Worry: Not on file     Inability: Not on file    Transportation needs     Medical: Not on file     Non-medical: Not on file   Tobacco Use    Smoking status: Former Smoker     Quit date: 6/5/2010     Years since quitting: 10.6    Smokeless tobacco: Never Used   Substance and Sexual Activity    Alcohol use: No     Alcohol/week: 0.0 standard drinks     Comment: occasional    Drug use: No    Sexual activity: Not on file   Lifestyle    Physical activity     Days per week: Not on file     Minutes per session: Not on file    Stress: Not on file   Relationships    Social connections     Talks on phone: Not on file     Gets together: Not on file     Attends Worship service: Not on file     Active member of club or organization: Not on file     Attends meetings of clubs or organizations: Not on file     Relationship status: Not on file    Intimate partner violence     Fear of current or ex partner: Not on file     Emotionally abused: Not on file     Physically abused: Not on file     Forced sexual activity: Not on file   Other Topics Concern    Not on file   Social History Narrative    Not on file       Family History   Problem Relation Age of Onset    Heart Disease Maternal Grandmother     Cancer Maternal Grandmother     Heart Disease Maternal Grandfather     Cancer Maternal Grandfather        Allergies:  Ciprofloxacin, Penicillins, and Ranitidine hcl    Home Medications:  Prior to Admission medications    Medication Sig Start Date End Date Taking?  Authorizing Provider   DULoxetine (CYMBALTA) 30 MG extended release capsule Take 30 mg by mouth daily    Historical Provider, MD   calcium carbonate 600 MG TABS tablet Take 1 tablet by mouth daily    Historical Provider, MD   carBAMazepine (TEGRETOL) 200 MG tablet Take 200 mg by mouth 3 times daily    Historical Provider, MD   Ergocalciferol (VITAMIN D2 PO) Take 1 tablet by mouth    Historical Provider, MD   Thiamine HCl (VITAMIN B1 PO) Take 1 tablet by mouth    Historical Provider, MD   omeprazole (PRILOSEC) 20 MG delayed release capsule take 1 capsule by mouth once daily 4/23/19   ROSALINDA Crowe CNP   DULoxetine (CYMBALTA) 60 MG extended release capsule take 1 capsule by mouth once daily 4/15/19   ROSALINDA Crowe CNP   topiramate (TOPAMAX) 100 MG tablet Take 1 tablet by mouth 2 times daily 1/14/19 10/13/20  Amanda Betancourt MD   amitriptyline (ELAVIL) 25 MG tablet take 1 tablet by mouth NIGHTLY 1/4/19   ROSALINDA Hussein CNP, RA ASPIRIN EC 81 MG EC tablet take 1 tablet by mouth once daily 9/24/18   Anarob Wilcox ROSALINDA Mejia CNP   SUMAtriptan (IMITREX) 100 MG tablet take 1 tablet by mouth AT ONSET headache MAY REPEAT IN 2 HOURS. NO MORE THAN 2 PER DAY 4 DAYS OUT OF A WEEK. 10/23/17   Anarob ROSALINDA Gandara CNP   acetaminophen (TYLENOL) 500 MG tablet Take 500 mg by mouth every 6 hours as needed for Pain    Historical Provider, MD   albuterol sulfate  (90 BASE) MCG/ACT inhaler Inhale 2 puffs into the lungs every 4 hours as needed for Wheezing    Historical Provider, MD       REVIEW OF SYSTEMS    (2-9 systems for level 4, 10 or more for level 5)      Review of Systems   Constitutional: Negative for activity change, appetite change, fatigue and fever. HENT: Negative for congestion, rhinorrhea and sore throat. Respiratory: Negative for cough, shortness of breath and wheezing. Cardiovascular: Negative for chest pain, palpitations and leg swelling. Gastrointestinal: Negative for abdominal distention, constipation, diarrhea, nausea and vomiting. Genitourinary: Negative for decreased urine volume and dysuria. Musculoskeletal: Positive for arthralgias, gait problem and joint swelling. Skin: Negative for rash. Neurological: Negative for dizziness, weakness, light-headedness, numbness and headaches. PHYSICAL EXAM   (up to 7 for level 4, 8 or more for level 5)     INITIAL VITALS:   BP (!) 148/102   Pulse 100   Temp 97.9 °F (36.6 °C) (Temporal)   Resp 16   SpO2 98%     Physical Exam  Vitals signs and nursing note reviewed. Constitutional:       General: She is not in acute distress. Appearance: She is well-developed. Comments: Non toxic appearing, speaking in full sentences without signs of distress   HENT:      Head: Normocephalic and atraumatic. Eyes:      General:         Right eye: No discharge. Left eye: No discharge.       Conjunctiva/sclera: Conjunctivae normal.   Cardiovascular:      Rate and Rhythm: Normal rate and regular rhythm. Heart sounds: Normal heart sounds. No murmur. No friction rub. No gallop. Pulmonary:      Effort: Pulmonary effort is normal. No respiratory distress. Breath sounds: Normal breath sounds. No wheezing or rales. Chest:      Chest wall: No tenderness. Abdominal:      General: There is no distension. Palpations: Abdomen is soft. There is no mass. Tenderness: There is no abdominal tenderness. There is no guarding or rebound. Musculoskeletal:      Comments: Patient with tenderness to the lateral right malleolus. No pain to the medial malleolus she also has pain at the base of the fifth met at tarsal bone. Patient is able to plantarflex and dorsiflex. No decreased range of motion noted. She has 2+ pedal pulse capillary refill of less than 2 seconds. No pain to the proximal fibular head she is able to flex and extend at the knee without pain or decreased range of motion. Sensation is intact to light touch. Skin:     General: Skin is warm and dry. Findings: No rash. Neurological:      Mental Status: She is alert and oriented to person, place, and time. DIFFERENTIAL  DIAGNOSIS     Patient with ankle injury, concern for fracture we will plan on x-ray imaging. She does have previous foot fracture in the past    PLAN (LABS / IMAGING / EKG):  Orders Placed This Encounter   Procedures    XR ANKLE RIGHT (MIN 3 VIEWS)    Crutches       MEDICATIONS ORDERED:  No orders of the defined types were placed in this encounter. DIAGNOSTIC RESULTS / EMERGENCY DEPARTMENT COURSE / [facility]     LABS:  No results found for this visit on 02/01/21. IMPRESSION: ankle injury    RADIOLOGY:  XR ANKLE RIGHT (MIN 3 VIEWS)   Final Result   Avulsion fracture of the lateral malleolus of uncertain chronicity.                  EMERGENCY DEPARTMENT COURSE:  Patient was evaluated, and discussed results of her x-ray findings she does have pain to the lateral malleolus so we will treat as acute fracture. She has a boot in place. Fracture is stable, will continue to have boot as well as crutches NSAIDs ice elevation and she has an orthopedic doctor who she seen in the past who she can follow-up with    PROCEDURES:      CONSULTS:  None    CRITICAL CARE:      FINAL IMPRESSION      1. Closed avulsion fracture of lateral malleolus of right fibula, initial encounter          DISPOSITION / PLAN     DISPOSITION Decision To Discharge 02/01/2021 09:42:03 PM      PATIENT REFERRED TO:  No follow-up provider specified.     DISCHARGE MEDICATIONS:  New Prescriptions    No medications on file       Enrique Jewell  9:43 PM    Attending Emergency Physician  [facility] ED    (Please note that portions of this note were completed with a voice recognition program.  Effortswere made to edit the dictations but occasionally words are mis-transcribed.)              Katheryn Vincent DO  02/01/21 [id number]

## 2021-03-24 ENCOUNTER — APPOINTMENT (OUTPATIENT)
Dept: GENERAL RADIOLOGY | Age: 51
End: 2021-03-24
Payer: MEDICAID

## 2021-03-24 ENCOUNTER — HOSPITAL ENCOUNTER (EMERGENCY)
Age: 51
Discharge: HOME | End: 2021-03-24
Payer: MEDICAID

## 2021-03-24 VITALS
SYSTOLIC BLOOD PRESSURE: 146 MMHG | BODY MASS INDEX: 41.03 KG/M2 | DIASTOLIC BLOOD PRESSURE: 123 MMHG | HEIGHT: 60 IN | WEIGHT: 209 LBS | HEART RATE: 98 BPM | TEMPERATURE: 98.1 F | OXYGEN SATURATION: 100 % | RESPIRATION RATE: 18 BRPM

## 2021-03-24 DIAGNOSIS — S69.92XA INJURY OF LEFT WRIST, INITIAL ENCOUNTER: Primary | ICD-10-CM

## 2021-03-24 PROCEDURE — 73110 X-RAY EXAM OF WRIST: CPT

## 2021-03-24 PROCEDURE — 99283 EMERGENCY DEPT VISIT LOW MDM: CPT

## 2021-03-24 RX ORDER — B-COMPLEX WITH VITAMIN C
1 TABLET ORAL DAILY
COMMUNITY
Start: 2021-01-21

## 2021-03-24 RX ORDER — GABAPENTIN 300 MG/1
300 CAPSULE ORAL 2 TIMES DAILY
COMMUNITY
Start: 2021-02-24

## 2021-03-24 RX ORDER — CYCLOBENZAPRINE HCL 10 MG
10 TABLET ORAL EVERY 8 HOURS PRN
COMMUNITY
Start: 2021-03-18

## 2021-03-24 RX ORDER — BROMPHENIRAMINE MALEATE, PSEUDOEPHEDRINE HYDROCHLORIDE, AND DEXTROMETHORPHAN HYDROBROMIDE 2; 30; 10 MG/5ML; MG/5ML; MG/5ML
5 SYRUP ORAL 4 TIMES DAILY PRN
COMMUNITY
Start: 2020-10-06

## 2021-03-24 RX ORDER — LACOSAMIDE 100 MG/1
100 TABLET ORAL
COMMUNITY

## 2021-03-24 RX ORDER — HYDROCHLOROTHIAZIDE 25 MG/1
12.5 TABLET ORAL DAILY
COMMUNITY
Start: 2021-03-18

## 2021-03-24 RX ORDER — SENNA PLUS 8.6 MG/1
8.6 TABLET ORAL 2 TIMES DAILY
COMMUNITY
Start: 2021-01-21

## 2021-03-24 RX ORDER — FLUTICASONE PROPIONATE 50 MCG
2 SPRAY, SUSPENSION (ML) NASAL DAILY PRN
COMMUNITY
Start: 2021-02-24

## 2021-03-24 RX ORDER — BUTALBITAL, ACETAMINOPHEN AND CAFFEINE 50; 325; 40 MG/1; MG/1; MG/1
1 TABLET ORAL 4 TIMES DAILY PRN
COMMUNITY

## 2021-03-24 RX ORDER — HYDROCODONE BITARTRATE AND ACETAMINOPHEN 5; 325 MG/1; MG/1
TABLET ORAL
COMMUNITY
Start: 2021-03-16

## 2021-03-24 ASSESSMENT — ENCOUNTER SYMPTOMS
VOMITING: 0
EYE DISCHARGE: 0
CONSTIPATION: 0
WHEEZING: 0
ABDOMINAL PAIN: 0
EYE REDNESS: 0
SHORTNESS OF BREATH: 0
SORE THROAT: 0
DIARRHEA: 0
BLOOD IN STOOL: 0
CHEST TIGHTNESS: 0
COUGH: 0
NAUSEA: 0
BACK PAIN: 0
RHINORRHEA: 0

## 2021-03-25 NOTE — ED PROVIDER NOTES
[facility] Emergency Department ED  EMERGENCY DEPARTMENT ENCOUNTER      Pt Name: Daniel Bhardwaj  MRN: [id number]  [place] 1970  Date of evaluation: 3/24/2021  Provider: Nevaeh Azevedo, [facility]     Chief Complaint   Patient presents with    Wrist Pain     pt states that her left wrist started hurting while using her phone 2 hours ago. no known injury. HISTORY OF PRESENT ILLNESS   (Location/Symptom, Timing/Onset, Context/Setting,Quality, Duration, Modifying Factors, Severity)  Note limiting factors. Daniel Bhardwaj is a54 y.o. female who presents to the emergency department with complaints of left wrist injury onset this evening. Patient reports she is left-handed. She states that she was on her phone with her left hand when she developed pain in her wrist.  She reports he does a lot of repetition with this hand. She denies any hand pain or elbow pain. She denies any fall or other trauma. She denies any numbness or tingling sensation. She took a Norco prior to arrival.  No other complaints at this time. HPI    Nursing Notes werereviewed. REVIEW OF SYSTEMS    (2-9 systems for level 4, 10 or more for level 5)     Review of Systems   Constitutional: Negative for chills, diaphoresis and fever. HENT: Negative for congestion, ear pain, rhinorrhea and sore throat. Eyes: Negative for discharge, redness and visual disturbance. Respiratory: Negative for cough, chest tightness, shortness of breath and wheezing. Cardiovascular: Negative for chest pain and palpitations. Gastrointestinal: Negative for abdominal pain, blood in stool, constipation, diarrhea, nausea and vomiting. Endocrine: Negative for polydipsia, polyphagia and polyuria. Genitourinary: Negative for decreased urine volume, difficulty urinating, dysuria, frequency and hematuria. Musculoskeletal: Positive for arthralgias. Negative for back pain and myalgias. Skin: Negative for pallor and rash. Allergic/Immunologic: Negative for food allergies and immunocompromised state. Neurological: Negative for dizziness, syncope, weakness and light-headedness. Hematological: Negative for adenopathy. Does not bruise/bleed easily. Psychiatric/Behavioral: Negative for behavioral problems and suicidal ideas. The patient is not nervous/anxious. Except as noted above the remainder of the review of systems was reviewed and negative. PAST MEDICAL HISTORY     Past Medical History:   Diagnosis Date    Arthritis     Cerebral palsy ([address]     Fibromyalgia     GERD (gastroesophageal reflux disease)     H/O cardiac catheterization 1/21/15    EF 55%. LMCA: Normal 0% stenosis. LAD: Mild irregularities 10-20%. LCx: Normal 0% stenosis. RCA: Normal 0% stenosis.  Headache(784.0)     History of cardiovascular stress test 1/14/15    Abnormal myocardial perfusion study. Small - moderate perfusion defect of mild - moderate intensity in the anterior and anteroseptal regions during stress imaging.      Hypertension     Seizures ([facility] [address] [facility]          SURGICALHISTORY       Past Surgical History:   Procedure Laterality Date    APPENDECTOMY  12/2/14    CARDIAC CATHETERIZATION Right     radial/femoral per / [facility]/ angioseal right femoral    HYSTERECTOMY      SD LEFT HEART CATH,PERCUTANEOUS Right 01/21/15    Cardiac Cath, Left Heart, right wrist/ right femoral with angioseal    TUBAL LIGATION           CURRENT MEDICATIONS       Discharge Medication List as of 3/24/2021  8:23 PM      CONTINUE these medications which have NOT CHANGED    Details   calcium carbonate-vitamin D 600-200 MG-UNIT TABS Take 1 tablet by mouth dailyHistorical Med      cyclobenzaprine (FLEXERIL) 10 MG tablet Take 10 mg by mouth every 8 hours as neededHistorical Med      fluticasone (FLONASE) 50 MCG/ACT nasal spray 2 sprays by Nasal route daily as neededHistorical Med      gabapentin (NEURONTIN) 300 MG capsule Take 300 mg by mouth 2 times daily.Historical Med      hydroCHLOROthiazide (HYDRODIURIL) 25 MG tablet Take 12.5 mg by mouth dailyHistorical Med      HYDROcodone-acetaminophen (NORCO) 5-325 MG per tablet Take one tablet in the morning and one around 2 pmHistorical Med      brompheniramine-pseudoephedrine-DM 2-30-10 MG/5ML syrup Take 5 mLs by mouth 4 times daily as neededHistorical Med      senna (SENOKOT) 8.6 MG tablet Take 8.6 mg by mouth 2 times dailyHistorical Med      Psyllium 48.57 % POWD Take 1 packet by mouth 3 times dailyHistorical Med      butalbital-acetaminophen-caffeine (FIORICET, ESGIC) -40 MG per tablet Take 1 tablet by mouth 4 times daily as neededHistorical Med      lacosamide (VIMPAT) 100 MG TABS tablet Take 100 mg by mouth. Historical Med      !! DULoxetine (CYMBALTA) 30 MG extended release capsule Take 30 mg by mouth dailyHistorical Med      carBAMazepine (TEGRETOL) 200 MG tablet Take 200 mg by mouth 3 times dailyHistorical Med      Ergocalciferol (VITAMIN D2 PO) Take 1 tablet by mouthHistorical Med      Thiamine HCl (VITAMIN B1 PO) Take 1 tablet by mouthHistorical Med      omeprazole (PRILOSEC) 20 MG delayed release capsule take 1 capsule by mouth once daily, Disp-90 capsule, R-0Normal      !! DULoxetine (CYMBALTA) 60 MG extended release capsule take 1 capsule by mouth once daily, Disp-30 capsule, R-0Normal      topiramate (TOPAMAX) 100 MG tablet Take 1 tablet by mouth 2 times daily, Disp-60 tablet,R-0Normal      RA ASPIRIN EC 81 MG EC tablet take 1 tablet by mouth once daily, Disp-90 tablet, R-3Normal      SUMAtriptan (IMITREX) 100 MG tablet take 1 tablet by mouth AT ONSET headache MAY REPEAT IN 2 HOURS.  NO MORE THAN 2 PER DAY 4 DAYS OUT OF A WEEK., Disp-8 tablet, R-0Normal      acetaminophen (TYLENOL) 500 MG tablet Take 500 mg by mouth every 6 hours as needed for Pain      albuterol sulfate  (90 BASE) MCG/ACT inhaler Inhale 2 puffs into the lungs every 4 hours as needed for Wheezing       !! - Potential duplicate medications found. Please discuss with provider.             ALLERGIES   Ciprofloxacin, Penicillins, and Ranitidine hcl    FAMILY HISTORY       Family History   Problem Relation Age of Onset    Heart Disease Maternal Grandmother     Cancer Maternal Grandmother     Heart Disease Maternal Grandfather     Cancer Maternal Grandfather           SOCIAL HISTORY       Social History     Socioeconomic History    Marital status: Legally      Spouse name: None    Number of children: None    Years of education: None    Highest education level: None   Occupational History    None   Social Needs    Financial resource strain: None    Food insecurity     Worry: None     Inability: None    Transportation needs     Medical: None     Non-medical: None   Tobacco Use    Smoking status: Former Smoker     Quit date: 6/5/2010     Years since quitting: 10.8    Smokeless tobacco: Never Used   Substance and Sexual Activity    Alcohol use: No     Alcohol/week: 0.0 standard drinks     Comment: occasional    Drug use: No    Sexual activity: None   Lifestyle    Physical activity     Days per week: None     Minutes per session: None    Stress: None   Relationships    Social connections     Talks on phone: None     Gets together: None     Attends Quaker service: None     Active member of club or organization: None     Attends meetings of clubs or organizations: None     Relationship status: None    Intimate partner violence     Fear of current or ex partner: None     Emotionally abused: None     Physically abused: None     Forced sexual activity: None   Other Topics Concern    None   Social History Narrative    None       SCREENINGS    Marc Coma Scale  Eye Opening: Spontaneous  Best Verbal Response: Oriented  Best Motor Response: Obeys commands  Marc Coma Scale Score: 15        PHYSICAL EXAM    (up to 7 for level 4, 8 or more for level 5)     ED Triage Vitals [03/24/21 1956]   BP Temp Temp Source Pulse Resp SpO2 Height Weight   (!) 146/123 98.1 °F (36.7 °C) Oral 98 18 100 % 5' (1.524 m) 209 lb (94.8 kg)       Physical Exam  Vitals signs and nursing note reviewed. Constitutional:       General: She is not in acute distress. Appearance: Normal appearance. She is well-developed. She is not ill-appearing, toxic-appearing or diaphoretic. HENT:      Head: Normocephalic and atraumatic. Right Ear: External ear normal.      Left Ear: External ear normal.      Mouth/Throat:      Pharynx: No oropharyngeal exudate. Eyes:      General: No scleral icterus. Right eye: No discharge. Left eye: No discharge. Conjunctiva/sclera: Conjunctivae normal.      Pupils: Pupils are equal, round, and reactive to light. Neck:      Musculoskeletal: Normal range of motion and neck supple. Thyroid: No thyromegaly. Trachea: No tracheal deviation. Cardiovascular:      Rate and Rhythm: Normal rate and regular rhythm. Heart sounds: No murmur. No friction rub. No gallop. Pulmonary:      Effort: Pulmonary effort is normal. No respiratory distress. Breath sounds: Normal breath sounds. No stridor. Musculoskeletal:         General: Tenderness present. No deformity. Comments: Pain with range of motion of the left wrist.  Positive Tinel's and positive Phalen's. No significant swelling. Full range of motion of elbow and hand. There is no tenderness of the hand. Intact distal pulses sensation capillary refill is less than 3 seconds. Lymphadenopathy:      Cervical: No cervical adenopathy. Skin:     General: Skin is warm and dry. Capillary Refill: Capillary refill takes less than 2 seconds. Findings: No erythema or rash. Neurological:      General: No focal deficit present. Mental Status: She is alert and oriented to person, place, and time. Cranial Nerves: No cranial nerve deficit. Motor: No abnormal muscle tone.       Deep Tendon Reflexes: Reflexes are normal and symmetric. Reflexes normal.   Psychiatric:         Behavior: Behavior normal.         DIAGNOSTIC RESULTS     EKG: All EKG's are interpreted by the Emergency Department Physician who either signs orCo-signs this chart in the absence of a cardiologist.      RADIOLOGY:   Non-plainfilm images such as CT, Ultrasound and MRI are read by the radiologist. Plain radiographic images are visualized and preliminarily interpreted by the emergency physician with the below findings:      Interpretationper the Radiologist below, if available at the time of this note:    XR WRIST LEFT (MIN 3 VIEWS)   Final Result   Soft tissue swelling with no evidence of an acute fracture. ED BEDSIDE ULTRASOUND:   Performed by ED Physician - none    LABS:  Labs Reviewed - No data to display    All other labs were within normal range or not returned as of this dictation. EMERGENCY DEPARTMENT COURSE and DIFFERENTIAL DIAGNOSIS/MDM:   Vitals:    Vitals:    03/24/21 1956   BP: (!) 146/123   Pulse: 98   Resp: 18   Temp: 98.1 °F (36.7 °C)   TempSrc: Oral   SpO2: 100%   Weight: 209 lb (94.8 kg)   Height: 5' (1.524 m)         MDM  53-year-old female who is left-handed who presents secondary to left wrist discomfort that began while she was on her phone using her left wrist.  Does a lot of repetition work with this left wrist.  Positive Tinel's and Phalen's on exam no cyanosis of the hand no open wounds will get x-ray to rule out acute fracture subluxation or other bony normality. Patient is resting comfortably at this time and in no distress. I have updated patient on current results. We discussed at length the patient's diagnosis. Patient understands to follow up with primary care provider in the next 2 days. Patient verbalized understanding of this. We went over medications. Strict return warnings were given. Patient will return to the emergency room as needed with new or worsening complaints.   Patient has been given information for orthopedic follow-up. They will call tomorrow to arrange follow-up within the next 48 hours. Patient is placed in wrist splint. She is instructed to follow-up with orthopedic. Procedures    FINAL IMPRESSION      1. Injury of left wrist, initial encounter        DISPOSITION/PLAN   DISPOSITION Decision To Discharge 03/24/2021 08:22:00 PM      PATIENT REFERRED TO:  [facility]  [address]  [address]  [phone]    If symptoms worsen, As needed    Jazmin Saldana MD  [address] [place] [address], [address] [place] [phone]  [address]  [phone]    Schedule an appointment as soon as possible for a visit       Latasha Alicia MD  [address]  [phone]    Schedule an appointment as soon as possible for a visit in 1 day        DISCHARGE MEDICATIONS:  Discharge Medication List as of 3/24/2021  8:23 PM                 Summation      Patient Course:      ED Medications administered this visit:  Medications - No data to display    New Prescriptions from this visit:    Discharge Medication List as of 3/24/2021  8:23 PM          Follow-up:  [facility]  [address]  [address]  [phone]    If symptoms worsen, As needed    Jazmin Saldana MD  [address] [place] [address], [address] [place] [phone]  [address]  [phone]    Schedule an appointment as soon as possible for a visit       Latasha Alicia MD  [address]  [phone]    Schedule an appointment as soon as possible for a visit in 1 day          Final Impression:   1.  Injury of left wrist, initial encounter               (Please note that portions of this note were completed with a voice recognition program.  Efforts were made to edit the dictations but occasionally words are mis-transcribed.)           Guadalupe Lee PA-C  03/24/21 2107

## 2021-05-09 ENCOUNTER — HOSPITAL ENCOUNTER (EMERGENCY)
Age: 51
Discharge: HOME | End: 2021-05-09
Attending: EMERGENCY MEDICINE
Payer: MEDICAID

## 2021-05-09 ENCOUNTER — APPOINTMENT (OUTPATIENT)
Dept: GENERAL RADIOLOGY | Age: 51
End: 2021-05-09
Payer: MEDICAID

## 2021-05-09 VITALS
TEMPERATURE: 97 F | DIASTOLIC BLOOD PRESSURE: 110 MMHG | SYSTOLIC BLOOD PRESSURE: 140 MMHG | HEART RATE: 97 BPM | OXYGEN SATURATION: 100 % | RESPIRATION RATE: 14 BRPM

## 2021-05-09 DIAGNOSIS — S93.401A SPRAIN OF RIGHT ANKLE, UNSPECIFIED LIGAMENT, INITIAL ENCOUNTER: Primary | ICD-10-CM

## 2021-05-09 PROCEDURE — 6370000000 HC RX 637 (ALT 250 FOR IP): Performed by: EMERGENCY MEDICINE

## 2021-05-09 PROCEDURE — 99283 EMERGENCY DEPT VISIT LOW MDM: CPT

## 2021-05-09 PROCEDURE — 73610 X-RAY EXAM OF ANKLE: CPT

## 2021-05-09 RX ORDER — HYDROCODONE BITARTRATE AND ACETAMINOPHEN 5; 325 MG/1; MG/1
2 TABLET ORAL ONCE
Status: COMPLETED | OUTPATIENT
Start: 2021-05-09 | End: 2021-05-09

## 2021-05-09 RX ORDER — IBUPROFEN 800 MG/1
800 TABLET ORAL EVERY 8 HOURS PRN
Qty: 30 TABLET | Refills: 0 | Status: SHIPPED | OUTPATIENT
Start: 2021-05-09

## 2021-05-09 RX ADMIN — HYDROCODONE BITARTRATE AND ACETAMINOPHEN 2 TABLET: 5; 325 TABLET ORAL at 19:57

## 2021-05-09 ASSESSMENT — ENCOUNTER SYMPTOMS
SHORTNESS OF BREATH: 0
WHEEZING: 0
COLOR CHANGE: 0
VOMITING: 0
ABDOMINAL PAIN: 0
NAUSEA: 0

## 2021-05-09 ASSESSMENT — PAIN SCALES - GENERAL: PAINLEVEL_OUTOF10: 7

## 2021-05-09 ASSESSMENT — PAIN DESCRIPTION - ORIENTATION: ORIENTATION: RIGHT

## 2021-05-09 ASSESSMENT — PAIN DESCRIPTION - LOCATION: LOCATION: FOOT

## 2021-05-09 NOTE — ED PROVIDER NOTES
[facility] ED  Emergency Department Encounter  EmergencyMedicine Attending     Pt Name:Kay Parrish  MRN: [id number]  Birthdate 1970  Date of evaluation: 5/9/21  PCP:  Nora Little MD    [address] [place] [address]       Chief Complaint   Patient presents with    Ankle Injury     right foot/ankle injury occured around 1000 today. patient rolled her foot down while she was stepping down. HISTORY OF PRESENT ILLNESS  (Location/Symptom, Timing/Onset, Context/Setting, Quality, Duration, Modifying Factors, Severity.)      Kay Sun is a 52 y.o. female who presents with right-sided ankle pain. Triage note was reviewed which stated the patient had foot pain. In fact this is mostly ankle pain. Rolled her ankle. Pain is 7 out of 10, took some ibuprofen earlier and some Tylenol at 3 PM with no significant relief. History of previous fracture in the foot on the right side. No head trauma, no other injuries, no neck pain or back pain, no chest pain abdominal pain. No cough congestion runny nose fevers or chills. Pain is sharp, right ankle, no radiation. PAST MEDICAL / SURGICAL / SOCIAL / FAMILY HISTORY      has a past medical history of Arthritis, Cerebral palsy ([address] Fibromyalgia, GERD (gastroesophageal reflux disease), H/O cardiac catheterization, Headache(784.0), History of cardiovascular stress test, Hypertension, and Seizures ([address] has a past surgical history that includes Hysterectomy; Tubal ligation; Appendectomy (12/2/14); Cardiac catheterization (Right); and pr left heart cath,percutaneous (Right, 01/21/15).     Social History     Socioeconomic History    Marital status: Legally      Spouse name: Not on file    Number of children: Not on file    Years of education: Not on file    Highest education level: Not on file   Occupational History    Not on file   Social Needs    Financial resource strain: Not on file    Food insecurity     Worry: Not on file Inability: Not on file    Transportation needs     Medical: Not on file     Non-medical: Not on file   Tobacco Use    Smoking status: Former Smoker     Quit date: 6/5/2010     Years since quitting: 10.9    Smokeless tobacco: Never Used   Substance and Sexual Activity    Alcohol use: No     Alcohol/week: 0.0 standard drinks     Comment: occasional    Drug use: No    Sexual activity: Not on file   Lifestyle    Physical activity     Days per week: Not on file     Minutes per session: Not on file    Stress: Not on file   Relationships    Social connections     Talks on phone: Not on file     Gets together: Not on file     Attends Sabianism service: Not on file     Active member of club or organization: Not on file     Attends meetings of clubs or organizations: Not on file     Relationship status: Not on file    Intimate partner violence     Fear of current or ex partner: Not on file     Emotionally abused: Not on file     Physically abused: Not on file     Forced sexual activity: Not on file   Other Topics Concern    Not on file   Social History Narrative    Not on file       Family History   Problem Relation Age of Onset    Heart Disease Maternal Grandmother     Cancer Maternal Grandmother     Heart Disease Maternal Grandfather     Cancer Maternal Grandfather        Allergies:  Ciprofloxacin, Penicillins, and Ranitidine hcl    Home Medications:  Prior to Admission medications    Medication Sig Start Date End Date Taking?  Authorizing Provider   ibuprofen (ADVIL;MOTRIN) 800 MG tablet Take 1 tablet by mouth every 8 hours as needed for Pain 5/9/21  Yes Sherri Morton MD   Psyllium 48.57 % POWD Take 1 packet by mouth 3 times daily   Yes Historical Provider, MD   calcium carbonate-vitamin D 600-200 MG-UNIT TABS Take 1 tablet by mouth daily 1/21/21  Yes Historical Provider, MD   fluticasone (FLONASE) 50 MCG/ACT nasal spray 2 sprays by Nasal route daily as needed 2/24/21  Yes Historical Provider, MD gabapentin (NEURONTIN) 300 MG capsule Take 300 mg by mouth 2 times daily. 2/24/21  Yes Historical Provider, MD   hydroCHLOROthiazide (HYDRODIURIL) 25 MG tablet Take 12.5 mg by mouth daily 3/18/21  Yes Historical Provider, MD   HYDROcodone-acetaminophen ([facility]) 5-325 MG per tablet Take one tablet in the morning and one around 2 pm 3/16/21  Yes Historical Provider, MD   lacosamide (VIMPAT) 100 MG TABS tablet Take 100 mg by mouth.    Yes Historical Provider, MD   senna (SENOKOT) 8.6 MG tablet Take 8.6 mg by mouth 2 times daily 1/21/21  Yes Historical Provider, MD   DULoxetine (CYMBALTA) 30 MG extended release capsule Take 30 mg by mouth daily   Yes Historical Provider, MD   carBAMazepine (TEGRETOL) 200 MG tablet Take 200 mg by mouth 3 times daily   Yes Historical Provider, MD   Ergocalciferol (VITAMIN D2 PO) Take 1 tablet by mouth   Yes Historical Provider, MD   Thiamine HCl (VITAMIN B1 PO) Take 1 tablet by mouth   Yes Historical Provider, MD   omeprazole (PRILOSEC) 20 MG delayed release capsule take 1 capsule by mouth once daily 4/23/19  Yes ROSALINDA Crowe CNP   DULoxetine (CYMBALTA) 60 MG extended release capsule take 1 capsule by mouth once daily 4/15/19  Yes ROSALINDA Crowe CNP   RA ASPIRIN EC 81 MG EC tablet take 1 tablet by mouth once daily 9/24/18  Yes ROSALINDA Reese CNP   albuterol sulfate  (90 BASE) MCG/ACT inhaler Inhale 2 puffs into the lungs every 4 hours as needed for Wheezing   Yes Historical Provider, MD   butalbital-acetaminophen-caffeine (FIORICET, ESGIC) -40 MG per tablet Take 1 tablet by mouth 4 times daily as needed    Historical Provider, MD   cyclobenzaprine (FLEXERIL) 10 MG tablet Take 10 mg by mouth every 8 hours as needed 3/18/21   Historical Provider, MD   brompheniramine-pseudoephedrine-DM 2-30-10 MG/5ML syrup Take 5 mLs by mouth 4 times daily as needed 10/6/20   Historical Provider, MD   topiramate (TOPAMAX) 100 MG tablet Take 1 tablet by mouth 2 times daily 1/14/19 10/13/20  Narendra Shaikh MD   SUMAtriptan (IMITREX) 100 MG tablet take 1 tablet by mouth AT ONSET headache MAY REPEAT IN 2 HOURS. NO MORE THAN 2 PER DAY 4 DAYS OUT OF A WEEK. 10/23/17   ROSALINDA Loza CNP   acetaminophen (TYLENOL) 500 MG tablet Take 500 mg by mouth every 6 hours as needed for Pain    Historical Provider, MD       REVIEW OF SYSTEMS    (2-9 systems for level 4, 10 or more for level 5)      Review of Systems   Constitutional: Negative for chills and fever. HENT: Negative for congestion. Respiratory: Negative for shortness of breath and wheezing. Cardiovascular: Negative for chest pain. Gastrointestinal: Negative for abdominal pain, nausea and vomiting. Musculoskeletal: Positive for arthralgias. Skin: Negative for color change. Neurological: Negative for weakness and headaches. PHYSICAL EXAM   (up to 7 for level 4, 8 or more for level 5)      INITIAL VITALS:   BP (!) 140/110   Pulse 97   Temp 97 °F (36.1 °C) (Tympanic)   Resp 14   SpO2 100%     Physical Exam  Vitals signs reviewed. Constitutional:       General: She is not in acute distress. Appearance: She is well-developed. HENT:      Head: Normocephalic and atraumatic. Cardiovascular:      Rate and Rhythm: Normal rate and regular rhythm. Heart sounds: Normal heart sounds. No murmur. No friction rub. No gallop. Pulmonary:      Effort: Pulmonary effort is normal. No respiratory distress. Breath sounds: Normal breath sounds. No wheezing or rales. Musculoskeletal:         General: Tenderness present. Comments: Mild amount of localized swelling over the lateral malleolus with tenderness. Tenderness both at the lateral and the medial malleolus. No tenderness over the foot itself. No tenderness over the proximal fibular head. Range of motion is intact, distal DP pulses are intact. Good range of motion. Skin:     General: Skin is warm. Findings: No erythema. Neurological:      Mental Status: She is alert. DIFFERENTIAL  DIAGNOSIS     PLAN (LABS / IMAGING / EKG):  Orders Placed This Encounter   Procedures    XR ANKLE RIGHT (MIN 3 VIEWS)       MEDICATIONS ORDERED:  Orders Placed This Encounter   Medications    HYDROcodone-acetaminophen (NORCO) 5-325 MG per tablet 2 tablet    ibuprofen (ADVIL;MOTRIN) 800 MG tablet     Sig: Take 1 tablet by mouth every 8 hours as needed for Pain     Dispense:  30 tablet     Refill:  0       DDX: Fracture versus sprain versus strain versus musculoskeletal pain    DIAGNOSTIC RESULTS / EMERGENCY DEPARTMENT COURSE / MDM   :  No results found for this visit on 05/09/21. IMPRESSION: 42-year-old female, right-sided ankle pain, twisted her ankle, will do x-ray, treat symptomatically. X-ray was negative for acute fracture we will continue pain control on outpatient basis, ice, ibuprofen. Follow-up with PCP, return to ER worsening symptoms. RADIOLOGY:    Xr Ankle Right (min 3 Views)    Result Date: 5/9/2021  EXAMINATION: THREE XRAY VIEWS OF THE RIGHT ANKLE 5/9/2021 4:51 pm COMPARISON: 02/01/2021 HISTORY: ORDERING SYSTEM PROVIDED HISTORY: Twisted ankle. Bilateral malleolus pain. TECHNOLOGIST PROVIDED HISTORY: Twisted ankle. Bilateral malleolus pain. FINDINGS: Old avulsion fracture involving the inferior tip of the lateral malleolus. . There is no evidence of acute fracture or dislocation. The  joint spaces appear well maintained. Mild soft tissue swelling. No acute bony abnormalities are noted       EKG  None    All EKG's are interpreted by the Emergency Department Physician who either signs or Co-signs this chart in the absence of a cardiologist.      PROCEDURES:  None    CONSULTS:  None    CRITICAL CARE:  None    FINAL IMPRESSION      1.  Sprain of right ankle, unspecified ligament, initial encounter          DISPOSITION / PLAN     DISPOSITION Decision To Discharge 05/09/2021 08:23:59 PM      PATIENT REFERRED TO:  Elias Bernstein Alex Ray, [facility] Said, [address]  [address]  [phone]    Call in 2 days        DISCHARGE MEDICATIONS:  New Prescriptions    IBUPROFEN (ADVIL;MOTRIN) 800 MG TABLET    Take 1 tablet by mouth every 8 hours as needed for Pain       Kirsty Veliz MD  Emergency Medicine Attending    (Please note that portions of thisnote were completed with a voice recognition program.  Efforts were made to edit the dictations but occasionally words are mis-transcribed.)        Kirsty Veliz MD  05/09/21 2029

## 2021-09-16 ENCOUNTER — HOSPITAL ENCOUNTER (EMERGENCY)
Age: 51
Discharge: HOME | End: 2021-09-16
Payer: MEDICAID

## 2021-09-16 ENCOUNTER — APPOINTMENT (OUTPATIENT)
Dept: GENERAL RADIOLOGY | Age: 51
End: 2021-09-16
Payer: MEDICAID

## 2021-09-16 VITALS
WEIGHT: 213 LBS | BODY MASS INDEX: 41.82 KG/M2 | SYSTOLIC BLOOD PRESSURE: 125 MMHG | RESPIRATION RATE: 16 BRPM | HEART RATE: 105 BPM | TEMPERATURE: 98.8 F | HEIGHT: 60 IN | DIASTOLIC BLOOD PRESSURE: 87 MMHG

## 2021-09-16 DIAGNOSIS — S93.401A SPRAIN OF RIGHT ANKLE, UNSPECIFIED LIGAMENT, INITIAL ENCOUNTER: Primary | ICD-10-CM

## 2021-09-16 PROCEDURE — 99282 EMERGENCY DEPT VISIT SF MDM: CPT

## 2021-09-16 PROCEDURE — 73630 X-RAY EXAM OF FOOT: CPT

## 2021-09-16 PROCEDURE — 73610 X-RAY EXAM OF ANKLE: CPT

## 2021-09-16 PROCEDURE — 6370000000 HC RX 637 (ALT 250 FOR IP): Performed by: PHYSICIAN ASSISTANT

## 2021-09-16 RX ORDER — IBUPROFEN 400 MG/1
400 TABLET ORAL ONCE
Status: COMPLETED | OUTPATIENT
Start: 2021-09-16 | End: 2021-09-16

## 2021-09-16 RX ADMIN — IBUPROFEN 400 MG: 400 TABLET, FILM COATED ORAL at 22:25

## 2021-09-16 ASSESSMENT — PAIN DESCRIPTION - ORIENTATION: ORIENTATION: RIGHT

## 2021-09-16 ASSESSMENT — PAIN DESCRIPTION - DESCRIPTORS: DESCRIPTORS: DISCOMFORT;SHOOTING

## 2021-09-16 ASSESSMENT — PAIN SCALES - GENERAL
PAINLEVEL_OUTOF10: 9
PAINLEVEL_OUTOF10: 9

## 2021-09-16 ASSESSMENT — PAIN DESCRIPTION - PAIN TYPE: TYPE: ACUTE PAIN

## 2021-09-16 ASSESSMENT — PAIN DESCRIPTION - LOCATION: LOCATION: FOOT;ANKLE

## 2021-09-17 NOTE — ED PROVIDER NOTES
[facility] Emergency Department ED  eMERGENCY dEPARTMENT eNCOUnter      Pt Name: Latisha Funes  MRN: [id number]  Birthdate 1970  Date of evaluation: 9/16/21      CHIEF COMPLAINT       Chief Complaint   Patient presents with    Foot Pain     right foot pain, pt presents wearing CAM walker boot from previous injury. Inability to WB         HISTORY OF PRESENT ILLNESS    Kay Jade is a 52 y.o. female who presents complaining of right ankle and foot pain   The history is provided by the patient. Ankle Problem  Location:  Ankle  Time since incident:  12 hours  Ankle location:  R ankle  Pain details:     Quality:  Aching    Radiates to:  Does not radiate    Severity:  Mild    Onset quality:  Gradual    Duration:  12 hours    Timing:  Intermittent    Progression:  Waxing and waning  Chronicity:  New  Dislocation: no    Foreign body present:  Unable to specify  Tetanus status:  Unknown  Prior injury to area:  Yes  Relieved by:  Nothing  Worsened by:  Nothing  Ineffective treatments:  None tried  Associated symptoms: decreased ROM and swelling    Risk factors: obesity    Risk factors comment:  She has's inverted her twisted her right ankle several times in the past.,  She has diffuse pain to the right ankle. REVIEW OF SYSTEMS       Review of Systems   Musculoskeletal: Positive for arthralgias. All other systems reviewed and are negative. PAST MEDICAL HISTORY     Past Medical History:   Diagnosis Date    Arthritis     [address] [address]     Fibromyalgia     GERD (gastroesophageal reflux disease)     H/O cardiac catheterization 1/21/15    EF 55%. LMCA: Normal 0% stenosis. LAD: Mild irregularities 10-20%. LCx: Normal 0% stenosis. RCA: Normal 0% stenosis.  Headache(784.0)     History of cardiovascular stress test 1/14/15    Abnormal myocardial perfusion study. Small - moderate perfusion defect of mild - moderate intensity in the anterior and anteroseptal regions during stress imaging.      (PRILOSEC) 20 MG DELAYED RELEASE CAPSULE    take 1 capsule by mouth once daily    PSYLLIUM 48.57 % POWD    Take 1 packet by mouth 3 times daily    RA ASPIRIN EC 81 MG EC TABLET    take 1 tablet by mouth once daily    SENNA (SENOKOT) 8.6 MG TABLET    Take 8.6 mg by mouth 2 times daily    SUMATRIPTAN (IMITREX) 100 MG TABLET    take 1 tablet by mouth AT ONSET headache MAY REPEAT IN 2 HOURS. NO MORE THAN 2 PER DAY 4 DAYS OUT OF A WEEK. THIAMINE HCL (VITAMIN B1 PO)    Take 1 tablet by mouth    TOPIRAMATE (TOPAMAX) 100 MG TABLET    Take 1 tablet by mouth 2 times daily       ALLERGIES     is allergic to ciprofloxacin, penicillins, and ranitidine hcl. FAMILY HISTORY     She indicated that her mother is alive. She indicated that her father is alive. She indicated that her maternal grandmother is . She indicated that her maternal grandfather is . SOCIAL HISTORY      reports that she quit smoking about 11 years ago. She has never used smokeless tobacco. She reports that she does not drink alcohol and does not use drugs. PHYSICAL EXAM     INITIAL VITALS: /87   Pulse 105   Temp 98.8 °F (37.1 °C) (Tympanic)   Resp 16   Ht 5' (1.524 m)   Wt 213 lb (96.6 kg)   BMI 41.60 kg/m²      Physical Exam  Vitals and nursing note reviewed. Constitutional:       Appearance: She is well-developed. HENT:      Head: Normocephalic and atraumatic. Cardiovascular:      Rate and Rhythm: Normal rate and regular rhythm. Heart sounds: Normal heart sounds. Pulmonary:      Effort: Pulmonary effort is normal.      Breath sounds: Normal breath sounds. Musculoskeletal:         General: Tenderness present. Comments: She has tenderness to the right ankle to the lateral anterior and medial aspects. There is no obvious deformity peripheral pulses are palpable she has brisk capillary refill. There is no redness or signs of trauma   Skin:     General: Skin is warm.       Capillary Refill: Capillary refill takes less than 2 seconds. Neurological:      Mental Status: She is alert and oriented to person, place, and time. MEDICAL DECISION MAKING:     No obvious fracture on the foot or ankle will place an Ace wrap she does have a walking boot that she came with today but she states that she is unable to bear any weight on this as the pain is too severe. She was given analgesics here in the emergency department ice and Ace wrap recommend using crutches nonweightbearing until cleared by the orthopedic or podiatrist.  Rest ice compress and elevate Tylenol Motrin for pain if needed    DIAGNOSTIC RESULTS     EKG: All EKG's are interpreted by the Emergency Department Physician who either signs or Co-signs this chart in the absence of acardiologist.        RADIOLOGY:Allplain film, CT, MRI, and formal ultrasound images (except ED bedside ultrasound) are read by the radiologist and the images and interpretations are directly viewed by the emergency physician. LABS:All lab results were reviewed by myself, and all abnormals are listed below. Labs Reviewed - No data to display      EMERGENCY DEPARTMENT COURSE:   Vitals:    Vitals:    09/16/21 1924 09/16/21 1926   BP:  125/87   Pulse: 105    Resp: 16    Temp: 98.8 °F (37.1 °C)    TempSrc: Tympanic    Weight: 213 lb (96.6 kg)    Height: 5' (1.524 m)        The patient was given the following medications while in the emergency department:  Orders Placed This Encounter   Medications    ibuprofen (ADVIL;MOTRIN) tablet 400 mg       -------------------------      CRITICAL CARE:       CONSULTS:  None    PROCEDURES:  Procedures    FINAL IMPRESSION      1.  Sprain of right ankle, unspecified ligament, initial encounter          DISPOSITION/PLAN   DISPOSITION Decision To Discharge 09/16/2021 09:41:31 PM      PATIENT REFERREDTO:  Audra Trevino MD  [address] [place] [id number] [phone]    Schedule an appointment as soon as possible for a visit in 2 days        DISCHARGEMEDICATIONS:  New Prescriptions    No medications on file       (Please note that portions of this note were completed with a voice recognition program.  Efforts were made to edit thedictations but occasionally words are mis-transcribed.)    HENRY Hicks PA-C  09/16/21 6230

## 2023-08-21 NOTE — TELEPHONE ENCOUNTER
Done.
Keep please contact the pharmacy and let them know that we no longer see this patient.   Thank you
room air

## 2023-11-14 ENCOUNTER — APPOINTMENT (OUTPATIENT)
Dept: GENERAL RADIOLOGY | Age: 53
End: 2023-11-14
Payer: MEDICAID

## 2023-11-14 ENCOUNTER — HOSPITAL ENCOUNTER (EMERGENCY)
Age: 53
Discharge: HOME | End: 2023-11-14
Payer: MEDICAID

## 2023-11-14 VITALS
HEART RATE: 94 BPM | OXYGEN SATURATION: 98 % | TEMPERATURE: 97.3 F | RESPIRATION RATE: 16 BRPM | DIASTOLIC BLOOD PRESSURE: 96 MMHG | SYSTOLIC BLOOD PRESSURE: 135 MMHG

## 2023-11-14 DIAGNOSIS — S93.401A SPRAIN OF RIGHT ANKLE, UNSPECIFIED LIGAMENT, INITIAL ENCOUNTER: Primary | ICD-10-CM

## 2023-11-14 PROCEDURE — 73630 X-RAY EXAM OF FOOT: CPT

## 2023-11-14 PROCEDURE — 99283 EMERGENCY DEPT VISIT LOW MDM: CPT

## 2023-11-14 PROCEDURE — 73610 X-RAY EXAM OF ANKLE: CPT

## 2023-11-14 PROCEDURE — 6370000000 HC RX 637 (ALT 250 FOR IP): Performed by: PHYSICIAN ASSISTANT

## 2023-11-14 RX ORDER — IBUPROFEN 600 MG/1
600 TABLET ORAL ONCE
Status: DISCONTINUED | OUTPATIENT
Start: 2023-11-14 | End: 2023-11-14 | Stop reason: HOSPADM

## 2023-11-14 RX ORDER — IBUPROFEN 600 MG/1
600 TABLET ORAL EVERY 6 HOURS PRN
Qty: 120 TABLET | Refills: 0 | Status: SHIPPED | OUTPATIENT
Start: 2023-11-14

## 2023-11-14 RX ORDER — ACETAMINOPHEN 325 MG/1
650 TABLET ORAL ONCE
Status: COMPLETED | OUTPATIENT
Start: 2023-11-14 | End: 2023-11-14

## 2023-11-14 RX ADMIN — ACETAMINOPHEN 650 MG: 325 TABLET ORAL at 13:41

## 2023-11-14 ASSESSMENT — PAIN SCALES - GENERAL
PAINLEVEL_OUTOF10: 8
PAINLEVEL_OUTOF10: 8

## 2023-11-14 ASSESSMENT — PAIN - FUNCTIONAL ASSESSMENT: PAIN_FUNCTIONAL_ASSESSMENT: 0-10

## 2023-11-14 ASSESSMENT — ENCOUNTER SYMPTOMS
SHORTNESS OF BREATH: 0
COUGH: 0

## 2023-11-14 ASSESSMENT — PAIN DESCRIPTION - LOCATION
LOCATION: ANKLE
LOCATION: ANKLE

## 2023-11-14 ASSESSMENT — PAIN DESCRIPTION - ORIENTATION: ORIENTATION: LEFT

## 2023-11-14 NOTE — ED PROVIDER NOTES
Moderate calcaneal enthesopathy at the plantar fascia insertion. LABS:  Labs Reviewed - No data to display    All other labs were within normal range or not returned as of this dictation. EMERGENCY DEPARTMENT COURSE and DIFFERENTIAL DIAGNOSIS/MDM:     Patient seen and evaluated in the emergency department with injury to the left ankle. Patient rolled the ankle. X-ray shows no acute fractures. Patient will be placed into the walking boot she provided. She can utilize Tylenol, Motrin, elevation, and ice. Plan is outpatient follow-up if symptoms persist.      FINAL IMPRESSION      1.  Sprain of right ankle, unspecified ligament, initial encounter          DISPOSITION/PLAN     DISPOSITION Decision To Discharge 11/14/2023 01:23:13 PM      PATIENT REFERRED TO:  Powell Mcardle, MD  [address] [place] [address]  [phone]    Schedule an appointment as soon as possible for a visit   As needed      DISCHARGE MEDICATIONS:  New Prescriptions    IBUPROFEN (IBU) 600 MG TABLET    Take 1 tablet by mouth every 6 hours as needed for Pain       (Please note that portions of this note were completed with a voice recognition program.  Efforts were made to edit the dictations but occasionally words are mis-transcribed.)    Vera Abbott PA-C (electronically signed)  Attending Emergency Physician           Vera Abbott PA-C  11/14/23 0033

## 2023-11-14 NOTE — DISCHARGE INSTRUCTIONS
Wear the walking boot as long as you have discomfort. Elevate and ice your ankle. Use Tylenol and Motrin for discomfort. If pain persist, follow-up with your primary care provider.

## 2025-03-31 ENCOUNTER — APPOINTMENT (OUTPATIENT)
Dept: GENERAL RADIOLOGY | Age: 55
End: 2025-03-31
Payer: MEDICAID

## 2025-03-31 ENCOUNTER — HOSPITAL ENCOUNTER (EMERGENCY)
Age: 55
Discharge: HOME | End: 2025-03-31
Attending: EMERGENCY MEDICINE
Payer: MEDICAID

## 2025-03-31 VITALS
TEMPERATURE: 98 F | DIASTOLIC BLOOD PRESSURE: 98 MMHG | SYSTOLIC BLOOD PRESSURE: 150 MMHG | RESPIRATION RATE: 16 BRPM | OXYGEN SATURATION: 96 % | HEART RATE: 86 BPM

## 2025-03-31 DIAGNOSIS — S93.602A SPRAIN OF LEFT FOOT, INITIAL ENCOUNTER: Primary | ICD-10-CM

## 2025-03-31 PROCEDURE — 29515 APPLICATION SHORT LEG SPLINT: CPT

## 2025-03-31 PROCEDURE — 73610 X-RAY EXAM OF ANKLE: CPT

## 2025-03-31 PROCEDURE — 73630 X-RAY EXAM OF FOOT: CPT

## 2025-03-31 PROCEDURE — 99283 EMERGENCY DEPT VISIT LOW MDM: CPT

## 2025-03-31 RX ORDER — NAPROXEN 500 MG/1
500 TABLET ORAL 2 TIMES DAILY
Qty: 14 TABLET | Refills: 0 | Status: SHIPPED | OUTPATIENT
Start: 2025-03-31 | End: 2025-04-07

## 2025-03-31 ASSESSMENT — PAIN DESCRIPTION - LOCATION: LOCATION: FOOT

## 2025-03-31 ASSESSMENT — PAIN - FUNCTIONAL ASSESSMENT
PAIN_FUNCTIONAL_ASSESSMENT: 0-10
PAIN_FUNCTIONAL_ASSESSMENT: 0-10

## 2025-03-31 ASSESSMENT — PAIN SCALES - GENERAL
PAINLEVEL_OUTOF10: 9
PAINLEVEL_OUTOF10: 2

## 2025-03-31 ASSESSMENT — PAIN DESCRIPTION - ORIENTATION: ORIENTATION: LEFT

## 2025-03-31 NOTE — ED PROVIDER NOTES
eMERGENCY dEPARTMENT eNCOUnter   Independent Attestation     Pt Name: Kay Callejas  MRN: [id number]  Birthdate 1970  Date of evaluation: 3/31/25     Kay Callejas is a 54 y.o. female with CC: Foot Injury (Patient walking puma the stairs last night, states twisted her left foot and buckled it. Patient states she feels that she broke foot.)        This visit was performed by both a physician and an APC. I performed all aspects of the MDM as documented.      Wu Rendon MD  Attending Emergency Physician            Wu Rendon MD  03/31/25 7471

## 2025-03-31 NOTE — ED PROVIDER NOTES
Emergency Department Encounter    Note to patient: The 21st Century Cures Act requires that medical notes like this one to be available to patients in the interest of transparency. Please be advised, this is a medical document. It is intended as [id number] communication. It is written in medical language and may contain abbreviations and verbiage that may be unfamiliar. It may appear to read blunt or direct. Medical documents are intended to carry relevant medical information, facts as evident and the clinical opinion of the practitioner.      Chief Complaint  Chief Complaint   Patient presents with    Foot Injury     Patient walking puma the stairs last night, states twisted her left foot and buckled it. Patient states she feels that she broke foot.        HPI  Patient slipped walking on the stairs last night twisting her left foot stating it buckled underneath her she feels like her foot might be broken.  She denies distal numbness tingling denies head injury or neck or back           Past Medical History  Past Medical History:   Diagnosis Date    Arthritis     Cerebral palsy (HCC)     Fibromyalgia     GERD (gastroesophageal reflux disease)     H/O cardiac catheterization 1/21/15    EF 55%. LMCA: Normal 0% stenosis. LAD: Mild irregularities 10-20%.   LCx: Normal 0% stenosis. RCA: Normal 0% stenosis.    Headache(784.0)     History of cardiovascular stress test 1/14/15    Abnormal myocardial perfusion study.  Small - moderate perfusion defect of mild - moderate intensity in the anterior and anteroseptal regions during stress imaging.     Hypertension     Seizures (HCC)         Surgical History  Past Surgical History:   Procedure Laterality Date    APPENDECTOMY  12/2/14    CARDIAC CATHETERIZATION Right     radial/femoral per / [facility]/ angioseal right femoral    HYSTERECTOMY (CERVIX STATUS UNKNOWN)      [place] LEFT HEART CATH,PERCUTANEOUS Right 01/21/15    Cardiac Cath, Left Heart, right wrist/ right femoral